# Patient Record
Sex: FEMALE | Race: BLACK OR AFRICAN AMERICAN | NOT HISPANIC OR LATINO | URBAN - METROPOLITAN AREA
[De-identification: names, ages, dates, MRNs, and addresses within clinical notes are randomized per-mention and may not be internally consistent; named-entity substitution may affect disease eponyms.]

---

## 2023-05-22 ENCOUNTER — OFFICE VISIT (OUTPATIENT)
Dept: PRIMARY CARE | Facility: CLINIC | Age: 68
End: 2023-05-22
Payer: COMMERCIAL

## 2023-05-22 ENCOUNTER — LAB (OUTPATIENT)
Dept: LAB | Facility: LAB | Age: 68
End: 2023-05-22
Payer: COMMERCIAL

## 2023-05-22 DIAGNOSIS — R73.9 BORDERLINE HYPERGLYCEMIA: ICD-10-CM

## 2023-05-22 DIAGNOSIS — E78.5 BORDERLINE HYPERLIPIDEMIA: ICD-10-CM

## 2023-05-22 DIAGNOSIS — I48.91 ATRIAL FIBRILLATION, UNSPECIFIED TYPE (MULTI): ICD-10-CM

## 2023-05-22 DIAGNOSIS — Z12.31 BREAST CANCER SCREENING BY MAMMOGRAM: ICD-10-CM

## 2023-05-22 DIAGNOSIS — I48.0 PAROXYSMAL ATRIAL FIBRILLATION WITH RVR (MULTI): ICD-10-CM

## 2023-05-22 DIAGNOSIS — I48.91 ATRIAL FIBRILLATION, UNSPECIFIED TYPE (MULTI): Primary | ICD-10-CM

## 2023-05-22 DIAGNOSIS — Z00.00 ROUTINE GENERAL MEDICAL EXAMINATION AT A HEALTH CARE FACILITY: ICD-10-CM

## 2023-05-22 LAB
ALANINE AMINOTRANSFERASE (SGPT) (U/L) IN SER/PLAS: 15 U/L (ref 7–45)
ALBUMIN (G/DL) IN SER/PLAS: 4.6 G/DL (ref 3.4–5)
ALKALINE PHOSPHATASE (U/L) IN SER/PLAS: 90 U/L (ref 33–136)
ANION GAP IN SER/PLAS: 15 MMOL/L (ref 10–20)
ASPARTATE AMINOTRANSFERASE (SGOT) (U/L) IN SER/PLAS: 17 U/L (ref 9–39)
BASOPHILS (10*3/UL) IN BLOOD BY AUTOMATED COUNT: 0.01 X10E9/L (ref 0–0.1)
BASOPHILS/100 LEUKOCYTES IN BLOOD BY AUTOMATED COUNT: 0.3 % (ref 0–2)
BILIRUBIN TOTAL (MG/DL) IN SER/PLAS: 0.6 MG/DL (ref 0–1.2)
CALCIUM (MG/DL) IN SER/PLAS: 10.6 MG/DL (ref 8.6–10.6)
CARBON DIOXIDE, TOTAL (MMOL/L) IN SER/PLAS: 31 MMOL/L (ref 21–32)
CHLORIDE (MMOL/L) IN SER/PLAS: 106 MMOL/L (ref 98–107)
CHOLESTEROL (MG/DL) IN SER/PLAS: 173 MG/DL (ref 0–199)
CHOLESTEROL IN HDL (MG/DL) IN SER/PLAS: 95.7 MG/DL
CHOLESTEROL/HDL RATIO: 1.8
CREATININE (MG/DL) IN SER/PLAS: 0.82 MG/DL (ref 0.5–1.05)
EOSINOPHILS (10*3/UL) IN BLOOD BY AUTOMATED COUNT: 0.01 X10E9/L (ref 0–0.7)
EOSINOPHILS/100 LEUKOCYTES IN BLOOD BY AUTOMATED COUNT: 0.3 % (ref 0–6)
ERYTHROCYTE DISTRIBUTION WIDTH (RATIO) BY AUTOMATED COUNT: 14.2 % (ref 11.5–14.5)
ERYTHROCYTE MEAN CORPUSCULAR HEMOGLOBIN CONCENTRATION (G/DL) BY AUTOMATED: 31.4 G/DL (ref 32–36)
ERYTHROCYTE MEAN CORPUSCULAR VOLUME (FL) BY AUTOMATED COUNT: 93 FL (ref 80–100)
ERYTHROCYTES (10*6/UL) IN BLOOD BY AUTOMATED COUNT: 5.26 X10E12/L (ref 4–5.2)
ESTIMATED AVERAGE GLUCOSE FOR HBA1C: 111 MG/DL
GFR FEMALE: 79 ML/MIN/1.73M2
GLUCOSE (MG/DL) IN SER/PLAS: 80 MG/DL (ref 74–99)
HEMATOCRIT (%) IN BLOOD BY AUTOMATED COUNT: 48.7 % (ref 36–46)
HEMOGLOBIN (G/DL) IN BLOOD: 15.3 G/DL (ref 12–16)
HEMOGLOBIN A1C/HEMOGLOBIN TOTAL IN BLOOD: 5.5 %
IMMATURE GRANULOCYTES/100 LEUKOCYTES IN BLOOD BY AUTOMATED COUNT: 0.3 % (ref 0–0.9)
LDL: 67 MG/DL (ref 0–99)
LEUKOCYTES (10*3/UL) IN BLOOD BY AUTOMATED COUNT: 4 X10E9/L (ref 4.4–11.3)
LYMPHOCYTES (10*3/UL) IN BLOOD BY AUTOMATED COUNT: 1.58 X10E9/L (ref 1.2–4.8)
LYMPHOCYTES/100 LEUKOCYTES IN BLOOD BY AUTOMATED COUNT: 40 % (ref 13–44)
MONOCYTES (10*3/UL) IN BLOOD BY AUTOMATED COUNT: 0.31 X10E9/L (ref 0.1–1)
MONOCYTES/100 LEUKOCYTES IN BLOOD BY AUTOMATED COUNT: 7.8 % (ref 2–10)
NEUTROPHILS (10*3/UL) IN BLOOD BY AUTOMATED COUNT: 2.03 X10E9/L (ref 1.2–7.7)
NEUTROPHILS/100 LEUKOCYTES IN BLOOD BY AUTOMATED COUNT: 51.3 % (ref 40–80)
NRBC (PER 100 WBCS) BY AUTOMATED COUNT: 0 /100 WBC (ref 0–0)
PLATELETS (10*3/UL) IN BLOOD AUTOMATED COUNT: 176 X10E9/L (ref 150–450)
POTASSIUM (MMOL/L) IN SER/PLAS: 5 MMOL/L (ref 3.5–5.3)
PROTEIN TOTAL: 7.6 G/DL (ref 6.4–8.2)
SODIUM (MMOL/L) IN SER/PLAS: 147 MMOL/L (ref 136–145)
THYROTROPIN (MIU/L) IN SER/PLAS BY DETECTION LIMIT <= 0.05 MIU/L: 0.72 MIU/L (ref 0.44–3.98)
TRIGLYCERIDE (MG/DL) IN SER/PLAS: 50 MG/DL (ref 0–149)
UREA NITROGEN (MG/DL) IN SER/PLAS: 15 MG/DL (ref 6–23)
VLDL: 10 MG/DL (ref 0–40)

## 2023-05-22 PROCEDURE — 85025 COMPLETE CBC W/AUTO DIFF WBC: CPT

## 2023-05-22 PROCEDURE — G0439 PPPS, SUBSEQ VISIT: HCPCS | Performed by: INTERNAL MEDICINE

## 2023-05-22 PROCEDURE — 99214 OFFICE O/P EST MOD 30 MIN: CPT | Performed by: INTERNAL MEDICINE

## 2023-05-22 PROCEDURE — 84443 ASSAY THYROID STIM HORMONE: CPT

## 2023-05-22 PROCEDURE — 80053 COMPREHEN METABOLIC PANEL: CPT

## 2023-05-22 PROCEDURE — 83036 HEMOGLOBIN GLYCOSYLATED A1C: CPT

## 2023-05-22 PROCEDURE — 36415 COLL VENOUS BLD VENIPUNCTURE: CPT

## 2023-05-22 PROCEDURE — 80061 LIPID PANEL: CPT

## 2023-05-22 RX ORDER — METOPROLOL SUCCINATE 50 MG/1
50 TABLET, EXTENDED RELEASE ORAL DAILY
Qty: 30 TABLET | Refills: 11 | Status: SHIPPED | OUTPATIENT
Start: 2023-05-22 | End: 2023-06-13

## 2023-06-01 NOTE — PROGRESS NOTES
No new concerns at this time. No new ED/urgent care visits. No new meds. Previous rash did improve with triamcinolone cream, however she thinks that the cream might have made her tiered/lethargic. She is taking BPs at home taken every day. Last one was 133/79. For exercise does low impact aerobics 4-5 days per week. Good diet consisting of chicken fish vegetables.     A 12-point ROS completed and negative except for that listed in HPI    BP: 144/88; WT: 126    Physical Exam  Constitutional: no acute distress, well appearing and well nourished.   Cardiovascular: irregularly, irregular  Pulmonary: lungs are clear to auscultation.   Abdomen: soft, non tender to palpation, normoactive bowel sounds. No organomegaly,   Extremities: no LE edema   Neurologic: normal examination of cranial nerves, normal examination of motor strength and normal examination of sensation.       Ms. Ramos is a 67 year old female who presents for annual visit. New cardiac irregularity noted on exam consistent with Afib.       #HTN  -has been poorly controlled in the past (possibly white coat) with a myriad med intolerances in the past including most recently lisin, spirono, Maxzide & amlo  -takes BPs at home and states that they are typically around the 130s/70s; in office today is 144/88  -will continue to monitor for now    #New onset Afib  -EKG in office consistent with Afib  -TTE ordered  -coreg and apixaban  -cardiology referral due to med intolerances    #HM   -cscope due 2023 (pending scheduled next month)  -mammogram in July 2021 negative - due July 2023  -normal dexa 03/2023

## 2023-06-07 ENCOUNTER — TELEPHONE (OUTPATIENT)
Dept: PRIMARY CARE | Facility: CLINIC | Age: 68
End: 2023-06-07
Payer: COMMERCIAL

## 2023-06-07 NOTE — TELEPHONE ENCOUNTER
Patient say she is scheduled for a colonoscopy  and need to know what to do about taking her eliquis , also she say she has been taking her bp meds but her hands stay very cold

## 2023-06-13 DIAGNOSIS — I48.91 ATRIAL FIBRILLATION, UNSPECIFIED TYPE (MULTI): ICD-10-CM

## 2023-06-13 RX ORDER — METOPROLOL SUCCINATE 50 MG/1
TABLET, EXTENDED RELEASE ORAL
Qty: 30 TABLET | Refills: 11 | Status: SHIPPED | OUTPATIENT
Start: 2023-06-13 | End: 2023-09-19 | Stop reason: ALTCHOICE

## 2023-08-23 ENCOUNTER — HOSPITAL ENCOUNTER (OUTPATIENT)
Dept: DATA CONVERSION | Facility: HOSPITAL | Age: 68
End: 2023-08-23
Attending: INTERNAL MEDICINE | Admitting: INTERNAL MEDICINE
Payer: COMMERCIAL

## 2023-08-23 DIAGNOSIS — I48.91 UNSPECIFIED ATRIAL FIBRILLATION (MULTI): ICD-10-CM

## 2023-08-24 LAB
ALANINE AMINOTRANSFERASE (SGPT) (U/L) IN SER/PLAS: NORMAL
ALBUMIN (G/DL) IN SER/PLAS: NORMAL
ALKALINE PHOSPHATASE (U/L) IN SER/PLAS: NORMAL
ANION GAP IN SER/PLAS: NORMAL
ASPARTATE AMINOTRANSFERASE (SGOT) (U/L) IN SER/PLAS: NORMAL
BILIRUBIN TOTAL (MG/DL) IN SER/PLAS: NORMAL
CALCIUM (MG/DL) IN SER/PLAS: NORMAL
CARBON DIOXIDE, TOTAL (MMOL/L) IN SER/PLAS: NORMAL
CHLORIDE (MMOL/L) IN SER/PLAS: NORMAL
CREATININE (MG/DL) IN SER/PLAS: NORMAL
GFR FEMALE: NORMAL
GFR MALE: NORMAL
GLUCOSE (MG/DL) IN SER/PLAS: NORMAL
POTASSIUM (MMOL/L) IN SER/PLAS: NORMAL
PROTEIN TOTAL: NORMAL
SODIUM (MMOL/L) IN SER/PLAS: NORMAL
UREA NITROGEN (MG/DL) IN SER/PLAS: NORMAL

## 2023-08-29 ENCOUNTER — OFFICE VISIT (OUTPATIENT)
Dept: PRIMARY CARE | Facility: CLINIC | Age: 68
End: 2023-08-29
Payer: COMMERCIAL

## 2023-08-29 DIAGNOSIS — I48.91 ATRIAL FIBRILLATION, UNSPECIFIED TYPE (MULTI): ICD-10-CM

## 2023-08-29 DIAGNOSIS — I10 BENIGN ESSENTIAL HYPERTENSION: Primary | ICD-10-CM

## 2023-08-29 LAB
ATRIAL RATE: 67 BPM
ATRIAL RATE: 97 BPM
P AXIS: 75 DEGREES
P OFFSET: 205 MS
P ONSET: 146 MS
PR INTERVAL: 162 MS
Q ONSET: 227 MS
Q ONSET: 228 MS
QRS COUNT: 11 BEATS
QRS COUNT: 20 BEATS
QRS DURATION: 64 MS
QRS DURATION: 64 MS
QT INTERVAL: 294 MS
QT INTERVAL: 342 MS
QTC CALCULATION(BAZETT): 361 MS
QTC CALCULATION(BAZETT): 422 MS
QTC FREDERICIA: 355 MS
QTC FREDERICIA: 374 MS
R AXIS: 106 DEGREES
R AXIS: 99 DEGREES
T AXIS: 47 DEGREES
T AXIS: 61 DEGREES
T OFFSET: 375 MS
T OFFSET: 398 MS
VENTRICULAR RATE: 124 BPM
VENTRICULAR RATE: 67 BPM

## 2023-08-29 PROCEDURE — 99214 OFFICE O/P EST MOD 30 MIN: CPT | Performed by: INTERNAL MEDICINE

## 2023-08-29 PROCEDURE — 1125F AMNT PAIN NOTED PAIN PRSNT: CPT | Performed by: INTERNAL MEDICINE

## 2023-08-29 RX ORDER — BISOPROLOL FUMARATE 10 MG/1
TABLET, FILM COATED ORAL
Qty: 30 TABLET | Refills: 1 | Status: SHIPPED | OUTPATIENT
Start: 2023-08-29 | End: 2023-09-11

## 2023-08-29 NOTE — PROGRESS NOTES
Sariah Ramos is a 68 yo F presenting for acute visit.     1. New onset AF 5.23 in office with me s/p normal TTE 6.23, started on Eliquis and Topro 50  and referred to cardiology s/p cardioversion 8-23-23.       2. HTN   -multiple med intolerances incl lisin, spiron, amlo, maxzide     3. Tinnitus     #HM   -screen labs 5.23  -mammo 7.23  -pap 7.19 with gyne   -cscope due and ordered and pending         ROS   CV neg CP neg exertional dyspnea   Neuro neg ha neg dizziness     O   160/97   65   Gen Aox3 NAD well appearing   HEENT mmm pharynx clear no cervical LAD   Eyes sclerae clear   CV rrr nl s1/2 no m/r/g  Pulm CTAB no adventitious sounds   Ext warm dry no edema 2+ DP pulse       EKG sinus velvet       A/P   Sariah presents with uncontrolled HTN in setitng of myriad med intolerances including thiazide, spirono, lisin and Maxzide now on Toprol 50 with incomplete control, on DOAC s/p DCCV last week.   -continue Eliquis at least 30 more days - has FU with cardiology   -stop metoprolol   -start bisopprolol 10   -RTO 1 month for a BP check

## 2023-09-11 ENCOUNTER — TELEPHONE (OUTPATIENT)
Dept: PRIMARY CARE | Facility: CLINIC | Age: 68
End: 2023-09-11
Payer: COMMERCIAL

## 2023-09-11 DIAGNOSIS — I10 BENIGN ESSENTIAL HYPERTENSION: Primary | ICD-10-CM

## 2023-09-11 RX ORDER — BISOPROLOL FUMARATE 5 MG/1
TABLET, FILM COATED ORAL
Qty: 30 TABLET | Refills: 0 | Status: SHIPPED | OUTPATIENT
Start: 2023-09-11 | End: 2023-10-04

## 2023-09-11 NOTE — TELEPHONE ENCOUNTER
Patient say on Saturday  she started feeling  light headed, clammy, and nauseous  , she checked her bp and  it was 97/68 and pulse was 45, and then 102/67 pulse 44

## 2023-09-19 ENCOUNTER — OFFICE VISIT (OUTPATIENT)
Dept: PRIMARY CARE | Facility: CLINIC | Age: 68
End: 2023-09-19
Payer: COMMERCIAL

## 2023-09-19 VITALS — DIASTOLIC BLOOD PRESSURE: 81 MMHG | SYSTOLIC BLOOD PRESSURE: 134 MMHG | HEART RATE: 65 BPM

## 2023-09-19 DIAGNOSIS — Z12.11 COLON CANCER SCREENING: Primary | ICD-10-CM

## 2023-09-19 PROCEDURE — 1126F AMNT PAIN NOTED NONE PRSNT: CPT | Performed by: INTERNAL MEDICINE

## 2023-09-19 PROCEDURE — 99213 OFFICE O/P EST LOW 20 MIN: CPT | Performed by: INTERNAL MEDICINE

## 2023-09-19 NOTE — PATIENT INSTRUCTIONS
Sariah,     Call [phone] to schedule colonoscopy with Dr. Matute.   See me back in 6 months for a physical!     CL

## 2023-09-19 NOTE — PROGRESS NOTES
"Sariah Ramos is a 66 yo F presenting for CPE.     AF 5.23 s/p normal TTE 6.23, started on Eliquis, Toprol, s/p DCCV 8-23-23 - saw cardiology 9.23 rec'ed OK to stop Eliquis 9.30.23  HTN - multiple med intolerances incl lisin, amlo, spirono, Maxzide   Tinnitus     #HM   -screen labs 5.23   -mammo 7.23   -cscope due   -pap 7.19 with gyne   -due flu, tdap, shingrix, [id number]        67\"   120         Gen Aox3 NAD well appearing   HEENT mmm pharynx clear no cervical LAD   Eyes sclerae clear   CV rrr nl s1/2 no m/r/g  Pulm CTAB no adventitious sounds   Ext warm dry no edema 2+ DP pulse         VSS - /81 JR 65   Gen alert well appeairng NAD   CV rr nl s1/2 no m/r/g   Pulm CTAB       A/P   BP is good control on bisoprolol 5; saw cardiology yesterday with EKG in NSR after DCCV with plan to discontinue DOAC at end of the month per cardiology.   OK to see me back in 6 mo sooner PRN with guidelines on if HR gets fast or irregular to call at once   "

## 2023-09-27 ENCOUNTER — APPOINTMENT (OUTPATIENT)
Dept: PRIMARY CARE | Facility: CLINIC | Age: 68
End: 2023-09-27
Payer: COMMERCIAL

## 2023-09-29 VITALS — WEIGHT: 125.22 LBS | BODY MASS INDEX: 19.65 KG/M2 | HEIGHT: 67 IN

## 2023-10-04 DIAGNOSIS — I10 BENIGN ESSENTIAL HYPERTENSION: ICD-10-CM

## 2023-10-04 RX ORDER — BISOPROLOL FUMARATE 5 MG/1
TABLET, FILM COATED ORAL
Qty: 30 TABLET | Refills: 0 | Status: SHIPPED | OUTPATIENT
Start: 2023-10-04 | End: 2023-10-30

## 2023-10-28 DIAGNOSIS — I10 BENIGN ESSENTIAL HYPERTENSION: ICD-10-CM

## 2023-10-30 RX ORDER — BISOPROLOL FUMARATE 5 MG/1
TABLET, FILM COATED ORAL
Qty: 90 TABLET | Refills: 1 | Status: SHIPPED | OUTPATIENT
Start: 2023-10-30 | End: 2024-04-29

## 2023-10-31 ENCOUNTER — OFFICE VISIT (OUTPATIENT)
Dept: PRIMARY CARE | Facility: CLINIC | Age: 68
End: 2023-10-31
Payer: COMMERCIAL

## 2023-10-31 VITALS — HEART RATE: 70 BPM | SYSTOLIC BLOOD PRESSURE: 145 MMHG | DIASTOLIC BLOOD PRESSURE: 90 MMHG

## 2023-10-31 DIAGNOSIS — R19.09 GROIN MASS: Primary | ICD-10-CM

## 2023-10-31 PROCEDURE — 1126F AMNT PAIN NOTED NONE PRSNT: CPT | Performed by: INTERNAL MEDICINE

## 2023-10-31 PROCEDURE — 99213 OFFICE O/P EST LOW 20 MIN: CPT | Performed by: INTERNAL MEDICINE

## 2023-10-31 ASSESSMENT — ENCOUNTER SYMPTOMS
EYES NEGATIVE: 1
ENDOCRINE NEGATIVE: 1
CARDIOVASCULAR NEGATIVE: 1
RESPIRATORY NEGATIVE: 1
GASTROINTESTINAL NEGATIVE: 1
PSYCHIATRIC NEGATIVE: 1
NEUROLOGICAL NEGATIVE: 1
CONSTITUTIONAL NEGATIVE: 1
MUSCULOSKELETAL NEGATIVE: 1
DIFFICULTY URINATING: 0

## 2023-10-31 NOTE — PATIENT INSTRUCTIONS
Sariah,     Go to radiology or call [phone] to schedule an ultrasound of that area on the right groin.     With those results, I will let you know the next steps.       CL

## 2023-10-31 NOTE — PROGRESS NOTES
Subjective   Patient ID: Sariah Ramos is a 68 y.o. female with pmhx of afib and HTN who presents with a chief complaint of a bump in her groin area.    Reports a knot in her groin.  States it's non-tender.  She noticed it 6 weeks ago.  States that sometimes it goes away and then occasionally she notices it.  Reports it to be internal.  Denies fevers, chills, or night-sweats.  Denies increased urinary frequency or burning urination.  She noticed it once in the past post shaving but states it resolved on it's own.  States she has been doing warm compresses and that seems to help bring it down.        Review of Systems   Constitutional: Negative.    HENT: Negative.     Eyes: Negative.    Respiratory: Negative.     Cardiovascular: Negative.    Gastrointestinal: Negative.    Endocrine: Negative.    Genitourinary: Negative.  Negative for difficulty urinating.   Musculoskeletal: Negative.    Skin: Negative.    Neurological: Negative.    Psychiatric/Behavioral: Negative.         Objective   Physical Exam  Constitutional:       General: She is not in acute distress.     Appearance: Normal appearance. She is normal weight.   HENT:      Head: Normocephalic and atraumatic.      Mouth/Throat:      Mouth: Mucous membranes are moist.   Eyes:      Extraocular Movements: Extraocular movements intact.   Cardiovascular:      Rate and Rhythm: Normal rate and regular rhythm.      Heart sounds: No murmur heard.     No gallop.   Pulmonary:      Effort: Pulmonary effort is normal. No respiratory distress.      Breath sounds: No wheezing.   Abdominal:      General: Abdomen is flat. There is no distension.      Palpations: Abdomen is soft.      Tenderness: There is no abdominal tenderness.   Genitourinary:     Comments: Mobile, nontender, inguinal lump, able to be manipulated, no erythema or warmth, no skin changes  Musculoskeletal:         General: Normal range of motion.      Cervical back: Normal range of motion.   Neurological:       General: No focal deficit present.      Mental Status: She is alert.   Psychiatric:         Mood and Affect: Mood normal.     There were no vitals taken for this visit.  Lab Results   Component Value Date    WBC 4.0 (L) 05/22/2023    HGB 15.3 05/22/2023    HCT 48.7 (H) 05/22/2023    MCV 93 05/22/2023     05/22/2023     Lab Results   Component Value Date    GLUCOSE CANCELED 08/24/2023    CALCIUM CANCELED 08/24/2023    NA CANCELED 08/24/2023    K CANCELED 08/24/2023    CO2 CANCELED 08/24/2023    CL CANCELED 08/24/2023    BUN CANCELED 08/24/2023    CREATININE CANCELED 08/24/2023       Assessment/Plan   Sariah Ramos is a 68 y.o. female with pmhx of afib and HTN who presents with a chief complaint of a bump in her groin area c/f possible hernia vs. Enlarged LN.  Notes it's non-tender and goes and comes.    #Inguinal Hernia vs. Enlarged LN  -will refer to gen-surg hernia specialist for eval   -pelvic ultrasound to rule out LN  -asymptomatic  -will ctm    #HTN  -cont. Current regimen    Maggie Madison  Internal Medicine, PGY-3

## 2023-11-08 ENCOUNTER — HOSPITAL ENCOUNTER (OUTPATIENT)
Dept: RADIOLOGY | Facility: HOSPITAL | Age: 68
Discharge: HOME | End: 2023-11-08
Payer: COMMERCIAL

## 2023-11-08 DIAGNOSIS — R19.09 GROIN MASS: ICD-10-CM

## 2023-11-08 PROCEDURE — 76881 US COMPL JOINT R-T W/IMG: CPT | Mod: RT

## 2023-11-08 PROCEDURE — 76882 US LMTD JT/FCL EVL NVASC XTR: CPT | Mod: RIGHT SIDE | Performed by: RADIOLOGY

## 2023-12-04 DIAGNOSIS — R19.09 GROIN MASS: Primary | ICD-10-CM

## 2023-12-18 PROBLEM — I10 WHITE COAT SYNDROME WITH DIAGNOSIS OF HYPERTENSION: Status: ACTIVE | Noted: 2023-12-18

## 2023-12-18 PROBLEM — I48.91 ATRIAL FIBRILLATION (MULTI): Status: ACTIVE | Noted: 2023-12-18

## 2023-12-18 PROBLEM — K21.9 GERD WITHOUT ESOPHAGITIS: Status: ACTIVE | Noted: 2023-12-18

## 2023-12-18 PROBLEM — L30.9 ECZEMA: Status: ACTIVE | Noted: 2023-12-18

## 2023-12-18 PROBLEM — M54.42 ACUTE LOW BACK PAIN WITH LEFT-SIDED SCIATICA: Status: ACTIVE | Noted: 2023-12-18

## 2023-12-18 PROBLEM — I10 BENIGN ESSENTIAL HYPERTENSION: Status: ACTIVE | Noted: 2023-12-18

## 2023-12-18 RX ORDER — TRIAMCINOLONE ACETONIDE 5 MG/G
CREAM TOPICAL
COMMUNITY
Start: 2023-03-16

## 2023-12-19 ENCOUNTER — OFFICE VISIT (OUTPATIENT)
Dept: CARDIOLOGY | Facility: CLINIC | Age: 68
End: 2023-12-19
Payer: COMMERCIAL

## 2023-12-19 VITALS
SYSTOLIC BLOOD PRESSURE: 130 MMHG | DIASTOLIC BLOOD PRESSURE: 76 MMHG | HEIGHT: 67 IN | HEART RATE: 63 BPM | BODY MASS INDEX: 17.6 KG/M2 | WEIGHT: 112.1 LBS | OXYGEN SATURATION: 98 %

## 2023-12-19 DIAGNOSIS — I10 BENIGN ESSENTIAL HYPERTENSION: Primary | ICD-10-CM

## 2023-12-19 PROCEDURE — 3078F DIAST BP <80 MM HG: CPT | Performed by: INTERNAL MEDICINE

## 2023-12-19 PROCEDURE — 99214 OFFICE O/P EST MOD 30 MIN: CPT | Performed by: INTERNAL MEDICINE

## 2023-12-19 PROCEDURE — 1126F AMNT PAIN NOTED NONE PRSNT: CPT | Performed by: INTERNAL MEDICINE

## 2023-12-19 PROCEDURE — 1036F TOBACCO NON-USER: CPT | Performed by: INTERNAL MEDICINE

## 2023-12-19 PROCEDURE — 1159F MED LIST DOCD IN RCRD: CPT | Performed by: INTERNAL MEDICINE

## 2023-12-19 PROCEDURE — 3075F SYST BP GE 130 - 139MM HG: CPT | Performed by: INTERNAL MEDICINE

## 2023-12-19 ASSESSMENT — ENCOUNTER SYMPTOMS
BLOATING: 0
ABDOMINAL PAIN: 0
OCCASIONAL FEELINGS OF UNSTEADINESS: 0
NAUSEA: 0
ALTERED MENTAL STATUS: 0
CONSTIPATION: 0
CHILLS: 0
MEMORY LOSS: 0
DEPRESSION: 0
WHEEZING: 0
FALLS: 0
COUGH: 0
MYALGIAS: 0
FEVER: 0
DIARRHEA: 0
HEADACHES: 0
LOSS OF SENSATION IN FEET: 0
DYSURIA: 0
HEMOPTYSIS: 0
VOMITING: 0
HEMATURIA: 0

## 2023-12-19 ASSESSMENT — COLUMBIA-SUICIDE SEVERITY RATING SCALE - C-SSRS
2. HAVE YOU ACTUALLY HAD ANY THOUGHTS OF KILLING YOURSELF?: NO
1. IN THE PAST MONTH, HAVE YOU WISHED YOU WERE DEAD OR WISHED YOU COULD GO TO SLEEP AND NOT WAKE UP?: NO
6. HAVE YOU EVER DONE ANYTHING, STARTED TO DO ANYTHING, OR PREPARED TO DO ANYTHING TO END YOUR LIFE?: NO

## 2023-12-19 ASSESSMENT — PATIENT HEALTH QUESTIONNAIRE - PHQ9
2. FEELING DOWN, DEPRESSED OR HOPELESS: MORE THAN HALF THE DAYS
SUM OF ALL RESPONSES TO PHQ9 QUESTIONS 1 AND 2: 2
1. LITTLE INTEREST OR PLEASURE IN DOING THINGS: NOT AT ALL
10. IF YOU CHECKED OFF ANY PROBLEMS, HOW DIFFICULT HAVE THESE PROBLEMS MADE IT FOR YOU TO DO YOUR WORK, TAKE CARE OF THINGS AT HOME, OR GET ALONG WITH OTHER PEOPLE: NOT DIFFICULT AT ALL

## 2023-12-19 ASSESSMENT — PAIN SCALES - GENERAL: PAINLEVEL: 0-NO PAIN

## 2023-12-19 NOTE — PATIENT INSTRUCTIONS
If having palpitations or heart rate is unusually high at home, come to office for non-emergent walk-in EKG (and resume Eliquis until can get the EKG)

## 2023-12-19 NOTE — PROGRESS NOTES
Chief complaint:  Follow-up     HPI  69 yo BF w/ h/o AFIB (dx'ed 5/23) s/p DCCV 8/23, HTN now here for cardiology f/u. She continues to feel good (no change after DCCV). No chest pain. No dyspnea. No palps. No LH/dizziness/syncope. +occ min LE edema. No claudication. No cough. +occ fatigue after taking BB. +occ snoring.  Originally when dx'ed with AFIB, she was having KIRK and palps. Since adding BB, no sig KIRK/palps.   BP at home: 120s/70s, HR 50s (was 90s in AFIB) [pt claims cuff correlated before]  ECG 12/18: SR (72)  ECG 5/22: SB (58)  ECG 5/23: AFIB (106), ?SMI  ECG 8/23: AFIB (124), low volt, ?ASMI  ECG 8/23: SR (67), PAC, low volt, ?SMI  ECG 9/23: SB (55), PACs, ?SMI  Echo 6/23: EF 65-70%, mild LAE, mild-mod TR [AFIB]     Review of Systems   Constitutional: Negative for chills, fever and malaise/fatigue.   HENT:  Negative for hearing loss.    Eyes:  Negative for visual disturbance.   Respiratory:  Negative for cough, hemoptysis and wheezing.    Skin:  Negative for rash.   Musculoskeletal:  Negative for falls and myalgias.   Gastrointestinal:  Negative for bloating, abdominal pain, constipation, diarrhea, dysphagia, nausea and vomiting.   Genitourinary:  Negative for dysuria and hematuria.   Neurological:  Negative for headaches.   Psychiatric/Behavioral:  Negative for altered mental status, depression and memory loss.         Social History     Tobacco Use    Smoking status: Never    Smokeless tobacco: Never   Substance Use Topics    Alcohol use: Never        No pertinent FHx    Allergies   Allergen Reactions    Iodinated Contrast Media Unknown    Lisinopril Unknown    Meperidine Unknown        Current Outpatient Medications   Medication Instructions    apixaban (ELIQUIS) 5 mg, oral, 2 times daily    bisoprolol (Zebeta) 5 mg tablet TAKE 1 TABELT BY MOUTH AT BEDTIME FOR BLOOD PRESSURE    triamcinolone (Kenalog) 0.5 % cream Topical        Vitals:    12/19/23 1145   BP: 130/76   Pulse: 63   SpO2: 98%         Physical Exam  Constitutional:       Appearance: Normal appearance.   HENT:      Head: Normocephalic and atraumatic.      Nose: Nose normal.   Neck:      Vascular: No carotid bruit.   Cardiovascular:      Rate and Rhythm: Normal rate and regular rhythm.      Heart sounds: No murmur heard.  Pulmonary:      Effort: Pulmonary effort is normal.      Breath sounds: Normal breath sounds.   Abdominal:      Palpations: Abdomen is soft.      Tenderness: There is no abdominal tenderness.   Musculoskeletal:      Right lower leg: No edema.      Left lower leg: No edema.   Skin:     General: Skin is warm and dry.   Neurological:      General: No focal deficit present.      Mental Status: She is alert.   Psychiatric:         Mood and Affect: Mood normal.         Judgment: Judgment normal.        Results/Data  5/23 Cr 0.82, K 5.0, LFT nl, LDL 67, HDL 96, TG 50, Chol 173, HGB 15.3, , hgba1c 5.5, TSH 0.72     Assessment/Plan   67 yo BF w/ h/o AFIB (dx'ed 5/23) s/p DCCV 8/23, HTN. Doing well. Remains in SR. Get walk-in EKG (and resume Eliquis) if palps or higher HR at home. Consider sleep study, simba if AFIB recurs and/or more snoring/fatigue.  Check CT cardiac score to determine need for ASA (off Eliquis)  -continue Bisoprolol 5 qd  -f/u 6 months (earlier if needed)    Steve Price MD

## 2023-12-21 ENCOUNTER — OFFICE VISIT (OUTPATIENT)
Dept: SURGERY | Facility: CLINIC | Age: 68
End: 2023-12-21
Payer: COMMERCIAL

## 2023-12-21 VITALS
DIASTOLIC BLOOD PRESSURE: 102 MMHG | SYSTOLIC BLOOD PRESSURE: 183 MMHG | BODY MASS INDEX: 17.81 KG/M2 | WEIGHT: 113.7 LBS | HEART RATE: 66 BPM | TEMPERATURE: 97.3 F

## 2023-12-21 DIAGNOSIS — R19.09 GROIN MASS: Primary | ICD-10-CM

## 2023-12-21 DIAGNOSIS — M67.451 GANGLION CYST OF RIGHT GROIN: ICD-10-CM

## 2023-12-21 PROCEDURE — 1159F MED LIST DOCD IN RCRD: CPT | Performed by: SURGERY

## 2023-12-21 PROCEDURE — 99203 OFFICE O/P NEW LOW 30 MIN: CPT | Performed by: SURGERY

## 2023-12-21 PROCEDURE — 1126F AMNT PAIN NOTED NONE PRSNT: CPT | Performed by: SURGERY

## 2023-12-21 PROCEDURE — 1036F TOBACCO NON-USER: CPT | Performed by: SURGERY

## 2023-12-21 PROCEDURE — 1160F RVW MEDS BY RX/DR IN RCRD: CPT | Performed by: SURGERY

## 2023-12-21 PROCEDURE — 3080F DIAST BP >= 90 MM HG: CPT | Performed by: SURGERY

## 2023-12-21 PROCEDURE — 3077F SYST BP >= 140 MM HG: CPT | Performed by: SURGERY

## 2023-12-21 NOTE — PROGRESS NOTES
History Of Present Illness  Sariah Ramos is a 68 y.o. female presenting a right groin cyst.  She has noticed it over the summer.  She initially thought was ingrown hair.  She had an ultrasound of this area that shows a simple cyst.  No evidence of any inguinal hernia.  She states that this mass does not come and go it tends to always be there.  Is not causing her any pain.  She has had no previous abdominal surgeries.  She had a recent GYN evaluation September.        Last Recorded Vitals  Blood pressure (!) 183/102, pulse 66, temperature 36.3 °C (97.3 °F), weight 51.6 kg (113 lb 11.2 oz).  Physical Examination    On examination she has a small 1-1/2 to 2 cm cyst in the right groin.  I attempted to aspirate it but was unable to do it successfully.  An upper standing up exam and lying down does not feel like any type of hernia is nonreducible.  The inguinal canal separate from it.  No evidence of abdominal ascites.    Relevant Results  Reviewed the ultrasound.    Assessment/Plan simple cyst of the right groin.  I do not think this is a hernia could be a communicating type of hydrocele we see in men but it does not seem to come and go for.  Inguinal canals otherwise intact.  She will follow-up me for repeat exam in 3 months.  Presently we will just watch this conservatively.  I discussed surgical excision of this looking to see if there is a small communicating hernia but presently it is not causing her discomfort and ultrasound did not show evidence of a true hernia.  She agrees with this conservative management.  She starts having problems she will contact me.    Nils Gonzáles MD FACS  Professor of Surgery  Tere Alfredo Chair in Surgical [place]  [facility] [employer]  [id number] [place] [address], [id number]  Phone [phone]  email: [email or web address]

## 2023-12-22 ENCOUNTER — APPOINTMENT (OUTPATIENT)
Dept: CARDIOLOGY | Facility: CLINIC | Age: 68
End: 2023-12-22
Payer: COMMERCIAL

## 2024-03-21 ENCOUNTER — OFFICE VISIT (OUTPATIENT)
Dept: SURGERY | Facility: CLINIC | Age: 69
End: 2024-03-21
Payer: COMMERCIAL

## 2024-03-21 VITALS
SYSTOLIC BLOOD PRESSURE: 183 MMHG | TEMPERATURE: 96.4 F | HEART RATE: 67 BPM | WEIGHT: 118.3 LBS | HEIGHT: 66 IN | BODY MASS INDEX: 19.01 KG/M2 | DIASTOLIC BLOOD PRESSURE: 91 MMHG

## 2024-03-21 DIAGNOSIS — R19.09 GROIN MASS: Primary | ICD-10-CM

## 2024-03-21 PROCEDURE — 1159F MED LIST DOCD IN RCRD: CPT | Performed by: SURGERY

## 2024-03-21 PROCEDURE — 1036F TOBACCO NON-USER: CPT | Performed by: SURGERY

## 2024-03-21 PROCEDURE — 99213 OFFICE O/P EST LOW 20 MIN: CPT | Performed by: SURGERY

## 2024-03-21 PROCEDURE — 3077F SYST BP >= 140 MM HG: CPT | Performed by: SURGERY

## 2024-03-21 PROCEDURE — 1126F AMNT PAIN NOTED NONE PRSNT: CPT | Performed by: SURGERY

## 2024-03-21 PROCEDURE — 3080F DIAST BP >= 90 MM HG: CPT | Performed by: SURGERY

## 2024-03-21 ASSESSMENT — PAIN SCALES - GENERAL: PAINLEVEL: 0-NO PAIN

## 2024-03-21 NOTE — PROGRESS NOTES
"History Of Present Illness  Sariah Ramos is a 68 y.o. female presenting for repeat evaluation of her right groin cyst.  She had an ultrasound that showed it was a simple cyst in October.  On my examination I did not feel like a hernia in December.  She is having no pain from it whatsoever.  Does not bother her.  She had a previous midline incision      Last Recorded Vitals  Blood pressure (!) 183/91, pulse 67, temperature 35.8 °C (96.4 °F), height 1.676 m (5' 6\"), weight 53.7 kg (118 lb 4.8 oz).  Physical Exam on examination the cyst has not changed in size.  Nontender.  She is midline incision.      Assessment/Plan   Cyst in right groin that is asymptomatic.  It is not changed in size.  I not think it is a hernia.  Ultrasound did not show is a hernia.  If she wants it removed she will follow-up with me otherwise we will just can continue to observe it.  She will follow-up me as needed.    Nils Gonzáles MD FACS  Professor of Surgery  Tere Alfredo Chair in Surgical [place]  [facility]  [id number] [place] [address], [phone]  Phone [phone]  email: [email or web address]        "

## 2024-04-02 ENCOUNTER — HOSPITAL ENCOUNTER (OUTPATIENT)
Dept: RADIOLOGY | Facility: CLINIC | Age: 69
Discharge: HOME | End: 2024-04-02
Payer: COMMERCIAL

## 2024-04-02 DIAGNOSIS — I10 BENIGN ESSENTIAL HYPERTENSION: ICD-10-CM

## 2024-04-02 PROCEDURE — 75571 CT HRT W/O DYE W/CA TEST: CPT

## 2024-04-20 ENCOUNTER — APPOINTMENT (OUTPATIENT)
Dept: RADIOLOGY | Facility: CLINIC | Age: 69
End: 2024-04-20
Payer: COMMERCIAL

## 2024-04-27 DIAGNOSIS — I10 BENIGN ESSENTIAL HYPERTENSION: ICD-10-CM

## 2024-04-29 RX ORDER — BISOPROLOL FUMARATE 5 MG/1
5 TABLET, FILM COATED ORAL NIGHTLY
Qty: 90 TABLET | Refills: 1 | Status: SHIPPED | OUTPATIENT
Start: 2024-04-29

## 2024-08-01 ENCOUNTER — APPOINTMENT (OUTPATIENT)
Dept: CARDIOLOGY | Facility: HOSPITAL | Age: 69
End: 2024-08-01
Payer: COMMERCIAL

## 2024-08-01 ENCOUNTER — HOSPITAL ENCOUNTER (EMERGENCY)
Facility: HOSPITAL | Age: 69
Discharge: HOME | End: 2024-08-01
Attending: INTERNAL MEDICINE
Payer: COMMERCIAL

## 2024-08-01 ENCOUNTER — APPOINTMENT (OUTPATIENT)
Dept: RADIOLOGY | Facility: HOSPITAL | Age: 69
End: 2024-08-01
Payer: COMMERCIAL

## 2024-08-01 VITALS
OXYGEN SATURATION: 99 % | SYSTOLIC BLOOD PRESSURE: 188 MMHG | TEMPERATURE: 97.7 F | WEIGHT: 123 LBS | BODY MASS INDEX: 19.3 KG/M2 | DIASTOLIC BLOOD PRESSURE: 105 MMHG | HEIGHT: 67 IN | HEART RATE: 58 BPM | RESPIRATION RATE: 18 BRPM

## 2024-08-01 DIAGNOSIS — I15.9 SECONDARY HYPERTENSION: Primary | ICD-10-CM

## 2024-08-01 LAB
ANION GAP SERPL CALC-SCNC: 14 MMOL/L (ref 10–20)
ATRIAL RATE: 80 BPM
BASOPHILS # BLD AUTO: 0.01 X10*3/UL (ref 0–0.1)
BASOPHILS NFR BLD AUTO: 0.2 %
BUN SERPL-MCNC: 19 MG/DL (ref 6–23)
CALCIUM SERPL-MCNC: 10.1 MG/DL (ref 8.6–10.3)
CARDIAC TROPONIN I PNL SERPL HS: <3 NG/L (ref 0–13)
CHLORIDE SERPL-SCNC: 102 MMOL/L (ref 98–107)
CO2 SERPL-SCNC: 28 MMOL/L (ref 21–32)
CREAT SERPL-MCNC: 0.77 MG/DL (ref 0.5–1.05)
EGFRCR SERPLBLD CKD-EPI 2021: 85 ML/MIN/1.73M*2
EOSINOPHIL # BLD AUTO: 0.01 X10*3/UL (ref 0–0.7)
EOSINOPHIL NFR BLD AUTO: 0.2 %
ERYTHROCYTE [DISTWIDTH] IN BLOOD BY AUTOMATED COUNT: 13.4 % (ref 11.5–14.5)
GLUCOSE SERPL-MCNC: 91 MG/DL (ref 74–99)
HCT VFR BLD AUTO: 48.3 % (ref 36–46)
HGB BLD-MCNC: 15.5 G/DL (ref 12–16)
IMM GRANULOCYTES # BLD AUTO: 0 X10*3/UL (ref 0–0.7)
IMM GRANULOCYTES NFR BLD AUTO: 0 % (ref 0–0.9)
LYMPHOCYTES # BLD AUTO: 1.4 X10*3/UL (ref 1.2–4.8)
LYMPHOCYTES NFR BLD AUTO: 33.5 %
MCH RBC QN AUTO: 29.6 PG (ref 26–34)
MCHC RBC AUTO-ENTMCNC: 32.1 G/DL (ref 32–36)
MCV RBC AUTO: 92 FL (ref 80–100)
MONOCYTES # BLD AUTO: 0.28 X10*3/UL (ref 0.1–1)
MONOCYTES NFR BLD AUTO: 6.7 %
NEUTROPHILS # BLD AUTO: 2.48 X10*3/UL (ref 1.2–7.7)
NEUTROPHILS NFR BLD AUTO: 59.4 %
NRBC BLD-RTO: 0 /100 WBCS (ref 0–0)
P AXIS: 82 DEGREES
P OFFSET: 199 MS
P ONSET: 152 MS
PLATELET # BLD AUTO: 157 X10*3/UL (ref 150–450)
POTASSIUM SERPL-SCNC: 3.7 MMOL/L (ref 3.5–5.3)
PR INTERVAL: 154 MS
Q ONSET: 229 MS
QRS COUNT: 13 BEATS
QRS DURATION: 60 MS
QT INTERVAL: 360 MS
QTC CALCULATION(BAZETT): 415 MS
QTC FREDERICIA: 396 MS
R AXIS: 101 DEGREES
RBC # BLD AUTO: 5.23 X10*6/UL (ref 4–5.2)
SODIUM SERPL-SCNC: 140 MMOL/L (ref 136–145)
T AXIS: 60 DEGREES
T OFFSET: 409 MS
VENTRICULAR RATE: 80 BPM
WBC # BLD AUTO: 4.2 X10*3/UL (ref 4.4–11.3)

## 2024-08-01 PROCEDURE — 71046 X-RAY EXAM CHEST 2 VIEWS: CPT | Performed by: RADIOLOGY

## 2024-08-01 PROCEDURE — 80048 BASIC METABOLIC PNL TOTAL CA: CPT | Performed by: INTERNAL MEDICINE

## 2024-08-01 PROCEDURE — 36415 COLL VENOUS BLD VENIPUNCTURE: CPT | Performed by: EMERGENCY MEDICINE

## 2024-08-01 PROCEDURE — 84484 ASSAY OF TROPONIN QUANT: CPT | Performed by: EMERGENCY MEDICINE

## 2024-08-01 PROCEDURE — 93005 ELECTROCARDIOGRAM TRACING: CPT

## 2024-08-01 PROCEDURE — 84484 ASSAY OF TROPONIN QUANT: CPT | Performed by: INTERNAL MEDICINE

## 2024-08-01 PROCEDURE — 2500000001 HC RX 250 WO HCPCS SELF ADMINISTERED DRUGS (ALT 637 FOR MEDICARE OP): Performed by: NURSE PRACTITIONER

## 2024-08-01 PROCEDURE — 80048 BASIC METABOLIC PNL TOTAL CA: CPT | Performed by: EMERGENCY MEDICINE

## 2024-08-01 PROCEDURE — 71046 X-RAY EXAM CHEST 2 VIEWS: CPT

## 2024-08-01 PROCEDURE — 85025 COMPLETE CBC W/AUTO DIFF WBC: CPT | Performed by: INTERNAL MEDICINE

## 2024-08-01 PROCEDURE — 99283 EMERGENCY DEPT VISIT LOW MDM: CPT

## 2024-08-01 PROCEDURE — 85025 COMPLETE CBC W/AUTO DIFF WBC: CPT | Performed by: EMERGENCY MEDICINE

## 2024-08-01 RX ORDER — CLONIDINE HYDROCHLORIDE 0.1 MG/1
0.2 TABLET ORAL ONCE
Status: COMPLETED | OUTPATIENT
Start: 2024-08-01 | End: 2024-08-01

## 2024-08-01 RX ORDER — ATENOLOL 25 MG/1
25 TABLET ORAL DAILY
Qty: 30 TABLET | Refills: 0 | Status: SHIPPED | OUTPATIENT
Start: 2024-08-01 | End: 2024-08-02 | Stop reason: WASHOUT

## 2024-08-01 RX ORDER — ATENOLOL 50 MG/1
25 TABLET ORAL ONCE
Status: COMPLETED | OUTPATIENT
Start: 2024-08-01 | End: 2024-08-01

## 2024-08-01 RX ADMIN — CLONIDINE HYDROCHLORIDE 0.2 MG: 0.1 TABLET ORAL at 16:42

## 2024-08-01 RX ADMIN — ATENOLOL 25 MG: 50 TABLET ORAL at 16:42

## 2024-08-01 ASSESSMENT — PAIN SCALES - GENERAL: PAINLEVEL_OUTOF10: 0 - NO PAIN

## 2024-08-01 ASSESSMENT — PAIN DESCRIPTION - FREQUENCY: FREQUENCY: INTERMITTENT

## 2024-08-01 ASSESSMENT — PAIN - FUNCTIONAL ASSESSMENT: PAIN_FUNCTIONAL_ASSESSMENT: 0-10

## 2024-08-01 ASSESSMENT — PAIN DESCRIPTION - PAIN TYPE: TYPE: ACUTE PAIN

## 2024-08-01 NOTE — ED TRIAGE NOTES
Pt states dizziness and slight chest pain that started 2 days ago. Pt states her blood pressure was elevated at urgent care.

## 2024-08-01 NOTE — ED PROVIDER NOTES
HPI   Chief Complaint   Patient presents with    Dizziness    Hypertension       68-year-old female presents today with hypertension and weakness.  She has been noncompliant with her antihypertensive due to multiple allergies to medication.  She presently is on Zebeta 5 mg but she told her primary that she could not comply with it due to digestive been having restless night sleep.  They told her to cut this about in half.  Zebeta 5 mg but she told her primary that she could not comply with it due to digestive and having restless night sleep.  They told her to cut this about in half.  She was on metoprolol could not tolerate.  She was on lisinopril cannot tolerate.  She was on amlodipine could not tolerate.  She has other allergies to contrast dye, latex, meperidine, and she presently has no neurologic deficit.  She is denying chest pain or dyspnea.  She is denying abdominal pain, nausea, or vomiting.  She able to ambulate under her own strength.  She went into Dr. Price's office her cardiologist today and they indicated that they can take her as a walk-in that she was too hypertensive and he was not available.  They sent her to our emergency department.      History provided by:  Patient and spouse   used: No            Patient History   No past medical history on file.  Past Surgical History:   Procedure Laterality Date    BREAST BIOPSY  2014    Biopsy Breast Open     SECTION, CLASSIC  2014     Section    TUBAL LIGATION  2014    Tubal Ligation     No family history on file.  Social History     Tobacco Use    Smoking status: Never    Smokeless tobacco: Never   Substance Use Topics    Alcohol use: Never    Drug use: Never       Physical Exam   ED Triage Vitals [24 1512]   Temperature Heart Rate Respirations BP   36.5 °C (97.7 °F) 88 18 (!) 210/103      Pulse Ox Temp Source Heart Rate Source Patient Position   99 % Temporal Monitor --      BP Location FiO2  (%)     -- --       Physical Exam  Constitutional:       Appearance: Normal appearance.   HENT:      Head: Normocephalic and atraumatic.      Right Ear: Tympanic membrane normal.      Left Ear: Tympanic membrane normal.      Nose: Nose normal.      Mouth/Throat:      Mouth: Mucous membranes are dry.   Eyes:      Extraocular Movements: Extraocular movements intact.      Pupils: Pupils are equal, round, and reactive to light.   Cardiovascular:      Rate and Rhythm: Normal rate and regular rhythm.      Pulses: Normal pulses.      Heart sounds: Normal heart sounds.   Pulmonary:      Effort: Pulmonary effort is normal.      Breath sounds: Normal breath sounds.   Abdominal:      General: Abdomen is flat.      Palpations: Abdomen is soft.   Musculoskeletal:         General: Normal range of motion.      Cervical back: Normal range of motion and neck supple.   Skin:     General: Skin is warm.      Capillary Refill: Capillary refill takes less than 2 seconds.   Neurological:      General: No focal deficit present.      Mental Status: She is alert and oriented to person, place, and time.   Psychiatric:         Mood and Affect: Mood normal.         Behavior: Behavior normal.           ED Course & MDM   Diagnoses as of 08/01/24 1716   Secondary hypertension                       [place] Coma Scale Score: 15         NIH Stroke Scale: 0                Medical Decision Making  NIH was 0 and stroke that was negative.  After multiple discussions with patient we finally agreed on atenolol and she will stop her present Zebeta medication.  I gave her 0.2 mg of clonidine we watched in our emergency department.  We will recheck her vital signs after medications been administered for approximately an hour.  Her troponin was undetectable.  Her BMP was normal.  Her CBC was also baseline for patient.  Her chest x-ray showed no evidence of an acute cardiopulmonary process and at this time she did not appear to have any neurologic deficit.  I  wrote to Dr. Price and advised him what our plans were and I also staffed patient with attending.  After only half hour from medication administration blood pressure was rechecked and was 188/105.  SpO2 was 100% heart rate was 60 bpm.  Patient appears to tolerate atenolol.  I sent 30-day supply to her pharmacy.  I requested appointment with primary and gave them primary contact information.  I wrote to her cardiologist as well.  Advised her cardiologist that were moving to atenolol and stopping her present medication which she cannot tolerate.  There was no neurologic deficit.        Procedure  Procedures     Bam Hinojosa, ANTHONY-ELSY  08/01/24 [id number]

## 2024-08-02 ENCOUNTER — OFFICE VISIT (OUTPATIENT)
Dept: PRIMARY CARE | Facility: CLINIC | Age: 69
End: 2024-08-02
Payer: COMMERCIAL

## 2024-08-02 DIAGNOSIS — I10 BENIGN ESSENTIAL HYPERTENSION: Primary | ICD-10-CM

## 2024-08-02 PROCEDURE — 1160F RVW MEDS BY RX/DR IN RCRD: CPT | Performed by: INTERNAL MEDICINE

## 2024-08-02 PROCEDURE — 1159F MED LIST DOCD IN RCRD: CPT | Performed by: INTERNAL MEDICINE

## 2024-08-02 PROCEDURE — 99213 OFFICE O/P EST LOW 20 MIN: CPT | Performed by: INTERNAL MEDICINE

## 2024-08-02 PROCEDURE — 1123F ACP DISCUSS/DSCN MKR DOCD: CPT | Performed by: INTERNAL MEDICINE

## 2024-08-02 PROCEDURE — 1036F TOBACCO NON-USER: CPT | Performed by: INTERNAL MEDICINE

## 2024-08-02 RX ORDER — HYDROCHLOROTHIAZIDE 25 MG/1
25 TABLET ORAL DAILY
Qty: 30 TABLET | Refills: 1 | Status: SHIPPED | OUTPATIENT
Start: 2024-08-02 | End: 2024-10-01

## 2024-08-02 NOTE — PROGRESS NOTES
Subjective   Patient ID: Sariah Ramos is a 68 y.o. female who presents for No chief complaint on file..    HPI     Patient is a 68-year-old female with past medical history of hypertension atrial fibrillation status postcardiac ablation who presents as follow-up from ER visit.  Patient was previously seeing Dr. Rhoades however has an upcoming appointment to see Dr. Kiley Topete.  In the meantime however she was seen in the emergency room yesterday for hypertension.  Her blood pressure was greater than 200.  She has previously been on bisoprolol and in the emergency room was switched to atenolol.  Her blood pressure is still elevated today but in the 1 50-1 60 systolic range.  She states that she checked her blood pressure this morning and it was 118 systolic.  She has an appointment to see cardiologist later on this month        Review of Systems  Constitutional: No fever or chills  Cardiovascular: no chest pain, no palpitations and no syncope.   Respiratory: no cough, no shortness of breath during exertion and no shortness of breath at rest.   Gastrointestinal: no abdominal pain, no nausea and no vomiting.  Neuro: No Headache, no dizziness    Objective   There were no vitals taken for this visit.    Physical Exam  Constitutional: Alert and in no acute distress. Well developed, well nourished  Head and Face: Head and face: Normal.    Cardiovascular: Heart rate and rhythm were normal, normal S1 and S2. No peripheral edema.   Pulmonary: No respiratory distress. Clear bilateral breath sounds.  Musculoskeletal: Gait and station: Normal. Muscle strength/tone: Normal.   Skin: Normal skin color and pigmentation, normal skin turgor, and no rash.    Psychiatric: Judgment and insight: Intact. Mood and affect: Normal.    Procedures    Lab Results   Component Value Date    WBC 4.2 (L) 08/01/2024    HGB 15.5 08/01/2024    HCT 48.3 (H) 08/01/2024     08/01/2024    CHOL 173 05/22/2023    TRIG 50 05/22/2023    HDL 95.7  05/22/2023    ALT CANCELED 08/24/2023    AST CANCELED 08/24/2023     08/01/2024    K 3.7 08/01/2024     08/01/2024    CREATININE 0.77 08/01/2024    BUN 19 08/01/2024    CO2 28 08/01/2024    TSH 0.72 05/22/2023    HGBA1C 5.5 05/22/2023       ECG 12 lead  Normal sinus rhythm  Possible Left atrial enlargement  Rightward axis  Septal infarct (cited on or before 31-JUL-2023)  Abnormal ECG  When compared with ECG of 18-SEP-2023 14:40,  Premature atrial complexes are no longer Present  See ED provider note for full interpretation and clinical correlation  Confirmed by Lori Hedrick ([id number]) on 8/1/2024 5:30:27 PM  XR chest 2 views  Narrative: Interpreted By:  Kalia Hunt,   STUDY:  XR CHEST 2 VIEWS;  8/1/2024 3:37 pm      INDICATION:  Signs/Symptoms:hypertension.      COMPARISON:  None.      ACCESSION NUMBER(S):  [id number]      ORDERING CLINICIAN:  ALEX VILLA      FINDINGS:                  CARDIOMEDIASTINAL SILHOUETTE:  Cardiomediastinal silhouette is normal in size and configuration.      LUNGS:  Lungs are clear.      ABDOMEN:  No remarkable upper abdominal findings.      BONES:  No acute osseous changes.      Impression: 1.  No evidence of acute cardiopulmonary process.              MACRO:  None      Signed by: Kalia Hunt 8/1/2024 3:38 PM  Dictation workstation:   [id number]            Assessment/Plan   Problem List Items Addressed This Visit             ICD-10-CM    Benign essential hypertension - Primary I10    Relevant Medications    hydroCHLOROthiazide (HYDRODiuril) 25 mg tablet     Given the low heart rate we will stop the atenolol and switch to hydrochlorothiazide.  Monitor ambulatory blood pressure and advised low-salt diet.  Follow-up with cardiology on August 20 for repeat blood pressure and evaluation and then follow-up with your new primary care doctor for continued management

## 2024-08-12 PROBLEM — R63.4 UNINTENDED WEIGHT LOSS: Status: ACTIVE | Noted: 2024-08-12

## 2024-08-12 PROBLEM — M25.559 ARTHRALGIA OF HIP: Status: ACTIVE | Noted: 2024-08-12

## 2024-08-12 PROBLEM — M25.519 SHOULDER PAIN: Status: ACTIVE | Noted: 2024-08-12

## 2024-08-12 PROBLEM — M79.676 PAIN OF TOE: Status: ACTIVE | Noted: 2024-08-12

## 2024-08-12 RX ORDER — VIT C/E/ZN/COPPR/LUTEIN/ZEAXAN 250MG-90MG
25 CAPSULE ORAL DAILY
COMMUNITY
End: 2024-08-13 | Stop reason: WASHOUT

## 2024-08-13 ENCOUNTER — OFFICE VISIT (OUTPATIENT)
Dept: CARDIOLOGY | Facility: CLINIC | Age: 69
End: 2024-08-13
Payer: COMMERCIAL

## 2024-08-13 VITALS
HEART RATE: 69 BPM | DIASTOLIC BLOOD PRESSURE: 92 MMHG | HEIGHT: 67 IN | SYSTOLIC BLOOD PRESSURE: 162 MMHG | WEIGHT: 124.5 LBS | BODY MASS INDEX: 19.54 KG/M2 | OXYGEN SATURATION: 98 %

## 2024-08-13 DIAGNOSIS — I10 BENIGN ESSENTIAL HYPERTENSION: Primary | ICD-10-CM

## 2024-08-13 PROCEDURE — 3077F SYST BP >= 140 MM HG: CPT | Performed by: INTERNAL MEDICINE

## 2024-08-13 PROCEDURE — 1126F AMNT PAIN NOTED NONE PRSNT: CPT | Performed by: INTERNAL MEDICINE

## 2024-08-13 PROCEDURE — 1123F ACP DISCUSS/DSCN MKR DOCD: CPT | Performed by: INTERNAL MEDICINE

## 2024-08-13 PROCEDURE — 3080F DIAST BP >= 90 MM HG: CPT | Performed by: INTERNAL MEDICINE

## 2024-08-13 PROCEDURE — 1159F MED LIST DOCD IN RCRD: CPT | Performed by: INTERNAL MEDICINE

## 2024-08-13 PROCEDURE — 3008F BODY MASS INDEX DOCD: CPT | Performed by: INTERNAL MEDICINE

## 2024-08-13 PROCEDURE — 1036F TOBACCO NON-USER: CPT | Performed by: INTERNAL MEDICINE

## 2024-08-13 PROCEDURE — 99214 OFFICE O/P EST MOD 30 MIN: CPT | Performed by: INTERNAL MEDICINE

## 2024-08-13 RX ORDER — TRIAMTERENE/HYDROCHLOROTHIAZID 37.5-25 MG
1 TABLET ORAL DAILY
Qty: 90 TABLET | Refills: 3 | Status: SHIPPED | OUTPATIENT
Start: 2024-08-13 | End: 2025-08-13

## 2024-08-13 ASSESSMENT — ENCOUNTER SYMPTOMS
VOMITING: 0
HEMOPTYSIS: 0
HEMATURIA: 0
FEVER: 0
MEMORY LOSS: 0
DEPRESSION: 0
ALTERED MENTAL STATUS: 0
MYALGIAS: 0
DYSURIA: 0
BLOATING: 0
WHEEZING: 0
HEADACHES: 0
ABDOMINAL PAIN: 0
DEPRESSION: 1
CHILLS: 0
DIARRHEA: 0
OCCASIONAL FEELINGS OF UNSTEADINESS: 0
NAUSEA: 0
LOSS OF SENSATION IN FEET: 0
COUGH: 0
FALLS: 0
CONSTIPATION: 0

## 2024-08-13 ASSESSMENT — PATIENT HEALTH QUESTIONNAIRE - PHQ9
1. LITTLE INTEREST OR PLEASURE IN DOING THINGS: NOT AT ALL
2. FEELING DOWN, DEPRESSED OR HOPELESS: NOT AT ALL
SUM OF ALL RESPONSES TO PHQ9 QUESTIONS 1 AND 2: 0

## 2024-08-13 ASSESSMENT — COLUMBIA-SUICIDE SEVERITY RATING SCALE - C-SSRS
1. IN THE PAST MONTH, HAVE YOU WISHED YOU WERE DEAD OR WISHED YOU COULD GO TO SLEEP AND NOT WAKE UP?: NO
6. HAVE YOU EVER DONE ANYTHING, STARTED TO DO ANYTHING, OR PREPARED TO DO ANYTHING TO END YOUR LIFE?: NO
2. HAVE YOU ACTUALLY HAD ANY THOUGHTS OF KILLING YOURSELF?: NO

## 2024-08-13 ASSESSMENT — PAIN SCALES - GENERAL: PAINLEVEL: 0-NO PAIN

## 2024-08-13 NOTE — PATIENT INSTRUCTIONS
Change hydrochlorothiazide to Triam-hydrochlorothiazide     Goal BP at least <140/90, optimal <130/80

## 2024-08-13 NOTE — PROGRESS NOTES
Chief Complaint   Patient presents with    Follow-up    Atrial Fibrillation    Hypertension       HPI  67 yo BF w/ h/o AFIB (dx'ed 5/23) s/p DCCV 8/23, HTN now here for cardiology f/u. She continues to feel good (no change after DCCV). No chest pain. No dyspnea. +occ palps x seconds only when startled. +occ LH/dizziness (less off BB and on reduced HCTZ). No syncope. +occ min LE edema. No claudication. No cough.   Originally when dx'ed with AFIB, she was having KIRK and palps. Since adding BB, no sig KIRK/palps.   BP at home: 120s/80s, HR 50s (was 90s in AFIB) [pt claims cuff correlated before]  ECG 12/18: SR (72)  ECG 5/22: SB (58)  ECG 5/23: AFIB (106), ?SMI  ECG 8/23: AFIB (124), low volt, ?ASMI  ECG 8/23: SR (67), PAC, low volt, ?SMI  ECG 9/23: SB (55), PACs, ?SMI  ECG 8/24: SR (80), ?LAE, ?SMI  Echo 6/23: EF 65-70%, mild LAE, mild-mod TR [AFIB]   CCS 4/24: 0, nl heart size, no peric eff, AsAo 3.2cm  CXR 8/24: no acute abnl    Review of Systems   Constitutional: Negative for chills, fever and malaise/fatigue.   HENT:  Negative for hearing loss.    Eyes:  Negative for visual disturbance.   Respiratory:  Negative for cough, hemoptysis and wheezing.    Skin:  Negative for rash.   Musculoskeletal:  Negative for falls and myalgias.   Gastrointestinal:  Negative for bloating, abdominal pain, constipation, diarrhea, dysphagia, nausea and vomiting.   Genitourinary:  Negative for dysuria and hematuria.   Neurological:  Negative for headaches.   Psychiatric/Behavioral:  Negative for altered mental status, depression and memory loss.       Social History     Tobacco Use    Smoking status: Never    Smokeless tobacco: Never   Substance Use Topics    Alcohol use: Never      No pertinent FHx    Allergies   Allergen Reactions    Iodinated Contrast Media Unknown    Latex Itching    Lisinopril Unknown    Meperidine Unknown      Current Outpatient Medications   Medication Instructions    hydroCHLOROthiazide (HYDRODIURIL) 25 mg, oral,  Daily    MULTIVITAMIN ORAL 1 tablet, oral, Daily      Vitals:    08/13/24 1435   BP: (!) 162/92   Pulse:    SpO2:       Physical Exam  Constitutional:       Appearance: Normal appearance.   HENT:      Head: Normocephalic and atraumatic.      Nose: Nose normal.   Neck:      Vascular: No carotid bruit.   Cardiovascular:      Rate and Rhythm: Normal rate and regular rhythm.      Heart sounds: No murmur heard.  Pulmonary:      Effort: Pulmonary effort is normal.      Breath sounds: Normal breath sounds.   Abdominal:      Palpations: Abdomen is soft.      Tenderness: There is no abdominal tenderness.   Musculoskeletal:      Right lower leg: No edema.      Left lower leg: No edema.   Skin:     General: Skin is warm and dry.   Neurological:      General: No focal deficit present.      Mental Status: She is alert.   Psychiatric:         Mood and Affect: Mood normal.         Judgment: Judgment normal.        Results/Data  8/24 Cr 0.77, K 3.7, HGB 15.5, , HSTPN <3  5/23 Cr 0.82, K 5.0, LFT nl, LDL 67, HDL 96, TG 50, Chol 173, HGB 15.3, , hgba1c 5.5, TSH 0.72     Assessment/Plan   67 yo BF w/ h/o AFIB (dx'ed 5/23) s/p DCCV 8/23, HTN. Doing well. Remains in SR. Get walk-in EKG (and resume Eliquis) if palps or higher HR at home. Consider sleep study, simba if AFIB recurs and/or snoring/fatigue.  -change hydrochlorothiazide 12.5 every day to triam-hydrochlorothiazide 37.5-25 1/2 every day (she claims dizzy on full tab of hydrochlorothiazide); felt poor on BB  -f/u 6 months (earlier if needed)    Steve Price MD

## 2024-08-20 ENCOUNTER — APPOINTMENT (OUTPATIENT)
Dept: CARDIOLOGY | Facility: CLINIC | Age: 69
End: 2024-08-20
Payer: COMMERCIAL

## 2024-09-05 ENCOUNTER — APPOINTMENT (OUTPATIENT)
Dept: PRIMARY CARE | Facility: CLINIC | Age: 69
End: 2024-09-05
Payer: COMMERCIAL

## 2024-09-05 ENCOUNTER — LAB (OUTPATIENT)
Dept: LAB | Facility: LAB | Age: 69
End: 2024-09-05
Payer: COMMERCIAL

## 2024-09-05 VITALS
HEIGHT: 67 IN | HEART RATE: 65 BPM | OXYGEN SATURATION: 97 % | BODY MASS INDEX: 19.62 KG/M2 | SYSTOLIC BLOOD PRESSURE: 168 MMHG | DIASTOLIC BLOOD PRESSURE: 100 MMHG | WEIGHT: 125 LBS

## 2024-09-05 DIAGNOSIS — Z12.31 VISIT FOR SCREENING MAMMOGRAM: ICD-10-CM

## 2024-09-05 DIAGNOSIS — I15.9 SECONDARY HYPERTENSION: ICD-10-CM

## 2024-09-05 DIAGNOSIS — I10 ACCELERATED HYPERTENSION: ICD-10-CM

## 2024-09-05 DIAGNOSIS — Z91.89 AT RISK FOR SLEEP APNEA: ICD-10-CM

## 2024-09-05 DIAGNOSIS — I10 ACCELERATED HYPERTENSION: Primary | ICD-10-CM

## 2024-09-05 LAB
CREAT UR-MCNC: 143.6 MG/DL (ref 20–320)
MICROALBUMIN UR-MCNC: 11.9 MG/L
MICROALBUMIN/CREAT UR: 8.3 UG/MG CREAT

## 2024-09-05 PROCEDURE — 3080F DIAST BP >= 90 MM HG: CPT | Performed by: INTERNAL MEDICINE

## 2024-09-05 PROCEDURE — 3077F SYST BP >= 140 MM HG: CPT | Performed by: INTERNAL MEDICINE

## 2024-09-05 PROCEDURE — 1160F RVW MEDS BY RX/DR IN RCRD: CPT | Performed by: INTERNAL MEDICINE

## 2024-09-05 PROCEDURE — 82570 ASSAY OF URINE CREATININE: CPT

## 2024-09-05 PROCEDURE — 1036F TOBACCO NON-USER: CPT | Performed by: INTERNAL MEDICINE

## 2024-09-05 PROCEDURE — 3008F BODY MASS INDEX DOCD: CPT | Performed by: INTERNAL MEDICINE

## 2024-09-05 PROCEDURE — 82043 UR ALBUMIN QUANTITATIVE: CPT

## 2024-09-05 PROCEDURE — 1123F ACP DISCUSS/DSCN MKR DOCD: CPT | Performed by: INTERNAL MEDICINE

## 2024-09-05 PROCEDURE — 1159F MED LIST DOCD IN RCRD: CPT | Performed by: INTERNAL MEDICINE

## 2024-09-05 PROCEDURE — 99214 OFFICE O/P EST MOD 30 MIN: CPT | Performed by: INTERNAL MEDICINE

## 2024-09-05 RX ORDER — LOSARTAN POTASSIUM 25 MG/1
25 TABLET ORAL DAILY
Qty: 90 TABLET | Refills: 1 | Status: SHIPPED | OUTPATIENT
Start: 2024-09-05

## 2024-09-05 NOTE — PROGRESS NOTES
"Subjective   Patient ID: Sariah Ramos is a 68 y.o. female who presents for Establish Care (New patient here to [facility] care).    HPI   The patient is 68 year old woman with PMH significant for HTN who presents to establish medical care.  Recently treated for atrial fibrillation with cardioversion, in normal sinus rhythm since 8/23.  Reports a history of intolerance of multiple medications, \"I changed my BP medications a lot over the years\".    Review of Systems  Negative   Objective   BP (!) 168/100   Pulse 65   Ht 1.689 m (5' 6.5\")   Wt 56.7 kg (125 lb)   SpO2 97%   BMI 19.87 kg/m²     Physical Exam  NAD. Cooperative.  Neck: WNL  Lungs CTA  Heart: RRR  Abdomen: WNL    Assessment/Plan   Diagnoses and all orders for this visit:  Accelerated hypertension  -     losartan (Cozaar) 25 mg tablet; Take 1 tablet (25 mg) by mouth once daily.  -     Albumin-Creatinine Ratio, Urine Random; Future  Visit for screening mammogram  -     BI mammo bilateral screening tomosynthesis; Future  Secondary hypertension  -     Referral to Primary Care  -     Referral to Primary Care  At risk for sleep apnea  -     Referral to Adult Sleep Medicine; Future    Risks and benefits of the prescribed medications were discussed.   Discussed uncontrolled HTN, BP goals were and cardiovascular complications due to uncontrolled state. Recommended to initiate a new antihypertensive, Losartan 25 mg daily, in addition to the current medication regimen, healthy life style, DASH eating diet. Home BP monitor validation in the office, if available. Close office BP monitoring in 3 weeks.  Reviewed medical records, from 5/23 to 8/24, discussed with the patient.         "

## 2024-09-17 ENCOUNTER — APPOINTMENT (OUTPATIENT)
Dept: PRIMARY CARE | Facility: CLINIC | Age: 69
End: 2024-09-17
Payer: COMMERCIAL

## 2024-09-23 ENCOUNTER — APPOINTMENT (OUTPATIENT)
Dept: PRIMARY CARE | Facility: CLINIC | Age: 69
End: 2024-09-23
Payer: COMMERCIAL

## 2024-09-23 VITALS
SYSTOLIC BLOOD PRESSURE: 180 MMHG | OXYGEN SATURATION: 96 % | BODY MASS INDEX: 20.19 KG/M2 | WEIGHT: 127 LBS | HEART RATE: 67 BPM | DIASTOLIC BLOOD PRESSURE: 99 MMHG

## 2024-09-23 DIAGNOSIS — I10 HYPERTENSION, UNSPECIFIED TYPE: Primary | ICD-10-CM

## 2024-09-23 DIAGNOSIS — I48.91 ATRIAL FIBRILLATION, UNSPECIFIED TYPE (MULTI): ICD-10-CM

## 2024-09-23 PROCEDURE — 99214 OFFICE O/P EST MOD 30 MIN: CPT | Performed by: INTERNAL MEDICINE

## 2024-09-23 PROCEDURE — 1123F ACP DISCUSS/DSCN MKR DOCD: CPT | Performed by: INTERNAL MEDICINE

## 2024-09-23 PROCEDURE — 1160F RVW MEDS BY RX/DR IN RCRD: CPT | Performed by: INTERNAL MEDICINE

## 2024-09-23 PROCEDURE — 1036F TOBACCO NON-USER: CPT | Performed by: INTERNAL MEDICINE

## 2024-09-23 PROCEDURE — 3080F DIAST BP >= 90 MM HG: CPT | Performed by: INTERNAL MEDICINE

## 2024-09-23 PROCEDURE — 3077F SYST BP >= 140 MM HG: CPT | Performed by: INTERNAL MEDICINE

## 2024-09-23 PROCEDURE — 1159F MED LIST DOCD IN RCRD: CPT | Performed by: INTERNAL MEDICINE

## 2024-09-23 RX ORDER — LOSARTAN POTASSIUM 50 MG/1
50 TABLET ORAL DAILY
Qty: 90 TABLET | Refills: 0 | Status: SHIPPED | OUTPATIENT
Start: 2024-09-23

## 2024-09-23 NOTE — PROGRESS NOTES
Subjective   Patient ID: Sariah Ramos is a 68 y.o. female who presents for Follow-up (Patient here for follow-up visit ).    HPI   The patient presents to the office for blood pressure monitoring, the current medication regimen is well tolerated.    Review of Systems  Negative    Objective   BP (!) 179/94   Pulse 67   Wt 57.6 kg (127 lb)   SpO2 96%   BMI 20.19 kg/m²     Physical Exam  NAD. Cooperative.  Lungs CTA  Heart: RRR  LE: negative     Assessment/Plan   Diagnoses and all orders for this visit:  Hypertension, unspecified type  -     losartan (Cozaar) 50 mg tablet; Take 1 tablet (50 mg) by mouth once daily.  Atrial fibrillation, unspecified type (Multi)  Resolved, cardioverted     HTN is uncontrolled, BP goals were discussed.  Recommended to increase Losartan to 50 mg daily in addition to hydrochlorothiazide 25 mg daily, BP monitoring in 4 weeks, recommended to bring her home BP device for validation.

## 2024-09-25 ENCOUNTER — HOSPITAL ENCOUNTER (OUTPATIENT)
Dept: RADIOLOGY | Facility: CLINIC | Age: 69
Discharge: HOME | End: 2024-09-25
Payer: COMMERCIAL

## 2024-09-25 VITALS — WEIGHT: 127 LBS | HEIGHT: 66 IN | BODY MASS INDEX: 20.41 KG/M2

## 2024-09-25 DIAGNOSIS — Z12.31 VISIT FOR SCREENING MAMMOGRAM: ICD-10-CM

## 2024-09-25 PROCEDURE — 77063 BREAST TOMOSYNTHESIS BI: CPT | Performed by: RADIOLOGY

## 2024-09-25 PROCEDURE — 77067 SCR MAMMO BI INCL CAD: CPT | Performed by: RADIOLOGY

## 2024-09-25 PROCEDURE — 77067 SCR MAMMO BI INCL CAD: CPT

## 2024-09-30 ENCOUNTER — APPOINTMENT (OUTPATIENT)
Dept: OBSTETRICS AND GYNECOLOGY | Facility: CLINIC | Age: 69
End: 2024-09-30
Payer: COMMERCIAL

## 2024-09-30 VITALS
BODY MASS INDEX: 19.46 KG/M2 | DIASTOLIC BLOOD PRESSURE: 84 MMHG | SYSTOLIC BLOOD PRESSURE: 152 MMHG | HEIGHT: 67 IN | WEIGHT: 124 LBS

## 2024-09-30 DIAGNOSIS — Z01.419 WELL WOMAN EXAM: Primary | ICD-10-CM

## 2024-09-30 DIAGNOSIS — N90.89 VULVAR LESION: ICD-10-CM

## 2024-09-30 PROCEDURE — 1159F MED LIST DOCD IN RCRD: CPT | Performed by: OBSTETRICS & GYNECOLOGY

## 2024-09-30 PROCEDURE — 3079F DIAST BP 80-89 MM HG: CPT | Performed by: OBSTETRICS & GYNECOLOGY

## 2024-09-30 PROCEDURE — 3077F SYST BP >= 140 MM HG: CPT | Performed by: OBSTETRICS & GYNECOLOGY

## 2024-09-30 PROCEDURE — 1126F AMNT PAIN NOTED NONE PRSNT: CPT | Performed by: OBSTETRICS & GYNECOLOGY

## 2024-09-30 PROCEDURE — 1036F TOBACCO NON-USER: CPT | Performed by: OBSTETRICS & GYNECOLOGY

## 2024-09-30 PROCEDURE — G0101 CA SCREEN;PELVIC/BREAST EXAM: HCPCS | Performed by: OBSTETRICS & GYNECOLOGY

## 2024-09-30 PROCEDURE — 1160F RVW MEDS BY RX/DR IN RCRD: CPT | Performed by: OBSTETRICS & GYNECOLOGY

## 2024-09-30 PROCEDURE — 3008F BODY MASS INDEX DOCD: CPT | Performed by: OBSTETRICS & GYNECOLOGY

## 2024-09-30 PROCEDURE — 1123F ACP DISCUSS/DSCN MKR DOCD: CPT | Performed by: OBSTETRICS & GYNECOLOGY

## 2024-09-30 ASSESSMENT — ENCOUNTER SYMPTOMS
BLOOD IN STOOL: 0
APPETITE CHANGE: 0
HEMATURIA: 0
FREQUENCY: 0
BACK PAIN: 0
ABDOMINAL PAIN: 0
SLEEP DISTURBANCE: 0
FEVER: 0
SHORTNESS OF BREATH: 0
DYSURIA: 0
FATIGUE: 0
CHILLS: 0
CONSTIPATION: 0
UNEXPECTED WEIGHT CHANGE: 0
VOMITING: 0
ABDOMINAL DISTENTION: 0
FLANK PAIN: 0
COLOR CHANGE: 0
NAUSEA: 0
DIARRHEA: 0

## 2024-09-30 ASSESSMENT — PAIN SCALES - GENERAL: PAINLEVEL: 0-NO PAIN

## 2024-09-30 NOTE — PROGRESS NOTES
"History Of Present Illness  Routine Gyn Exam  Sariah R Richard here for routine WWE.  Pt is postmenopausal.  Denies spotting or bleeding.     Concerns: none.     Medical and surgical histories reviewed with patient.   Exercise: regular .     Gynecologic History  Postmenopausal.  Sexually active: Yes with one male partner/ .  Last Pap:  ASCUS, HPV negative .   Last mammogram: .   Last Colonoscopy:  pt reports she is due and working on scheduling .   Last DEXA: .     Obstetric History  OB History    Para Term  AB Living   4 4 4         SAB IAB Ectopic Multiple Live Births                  # Outcome Date GA Lbr Temo/2nd Weight Sex Type Anes PTL Lv   4 Term            3 Term            2 Term            1 Term                 Review of Systems   Constitutional:  Negative for appetite change, chills, fatigue, fever and unexpected weight change.   Respiratory:  Negative for shortness of breath.    Cardiovascular:  Negative for chest pain.   Gastrointestinal:  Negative for abdominal distention, abdominal pain, blood in stool, constipation, diarrhea, nausea and vomiting.   Endocrine: Negative for cold intolerance and heat intolerance.   Genitourinary:  Negative for dyspareunia, dysuria, flank pain, frequency, genital sores, hematuria, menstrual problem, pelvic pain, urgency, vaginal bleeding, vaginal discharge and vaginal pain.   Musculoskeletal:  Negative for back pain.   Skin:  Negative for color change.   Psychiatric/Behavioral:  Negative for sleep disturbance.        /84 (BP Location: Left arm, Patient Position: Sitting)   Ht 1.689 m (5' 6.5\")   Wt 56.2 kg (124 lb)   BMI 19.71 kg/m²      Physical Exam  Constitutional:       Appearance: Normal appearance.   HENT:      Head: Normocephalic and atraumatic.   Chest:   Breasts:     Right: Normal.      Left: Normal.   Abdominal:      General: Abdomen is flat.      Palpations: Abdomen is soft.      Tenderness: There is no abdominal " tenderness.   Genitourinary:     General: Normal vulva.      Vagina: Normal.      Cervix: Normal.      Uterus: Normal.       Adnexa: Right adnexa normal and left adnexa normal.          Comments: 3 hypopigmented plaques on inner right labia   Skin:     General: Skin is warm and dry.   Neurological:      Mental Status: She is alert and oriented to person, place, and time.   Psychiatric:         Mood and Affect: Mood normal.              Assessment/Plan         Routine Well Woman Exam Today  Discussed diet and exercise.   Reviewed routine health screenings.   Pap: 2023 ASCUS, HPV negative ; repeat 2026  Recommend annual mammograms.      Vulvar lesion  Recommend biopsy  Pt will schedule and return for biopsy at her convenience          Tatyana Bourgeois MD

## 2024-10-02 ENCOUNTER — OFFICE VISIT (OUTPATIENT)
Dept: OBSTETRICS AND GYNECOLOGY | Facility: CLINIC | Age: 69
End: 2024-10-02
Payer: COMMERCIAL

## 2024-10-02 VITALS
WEIGHT: 121 LBS | SYSTOLIC BLOOD PRESSURE: 140 MMHG | DIASTOLIC BLOOD PRESSURE: 88 MMHG | HEIGHT: 67 IN | BODY MASS INDEX: 18.99 KG/M2

## 2024-10-02 DIAGNOSIS — N90.89 VULVAR LESION: Primary | ICD-10-CM

## 2024-10-02 PROCEDURE — 1123F ACP DISCUSS/DSCN MKR DOCD: CPT | Performed by: OBSTETRICS & GYNECOLOGY

## 2024-10-02 PROCEDURE — 3077F SYST BP >= 140 MM HG: CPT | Performed by: OBSTETRICS & GYNECOLOGY

## 2024-10-02 PROCEDURE — 1159F MED LIST DOCD IN RCRD: CPT | Performed by: OBSTETRICS & GYNECOLOGY

## 2024-10-02 PROCEDURE — 3079F DIAST BP 80-89 MM HG: CPT | Performed by: OBSTETRICS & GYNECOLOGY

## 2024-10-02 PROCEDURE — 3008F BODY MASS INDEX DOCD: CPT | Performed by: OBSTETRICS & GYNECOLOGY

## 2024-10-02 PROCEDURE — 56605 BIOPSY OF VULVA/PERINEUM: CPT | Performed by: OBSTETRICS & GYNECOLOGY

## 2024-10-02 PROCEDURE — 1036F TOBACCO NON-USER: CPT | Performed by: OBSTETRICS & GYNECOLOGY

## 2024-10-02 PROCEDURE — 1126F AMNT PAIN NOTED NONE PRSNT: CPT | Performed by: OBSTETRICS & GYNECOLOGY

## 2024-10-02 ASSESSMENT — PAIN SCALES - GENERAL: PAINLEVEL: 0-NO PAIN

## 2024-10-02 NOTE — PROGRESS NOTES
"Patient ID: Sariah Ramos is a 68 y.o. female.  Pt presents for vulvar biopsy after concerning findings on vulva during routine annual exam recently.   Currently asymptomatic.     Visit Vitals  /88 (BP Location: Left arm, Patient Position: Sitting)   Ht 1.689 m (5' 6.5\")   Wt 54.9 kg (121 lb)   BMI 19.24 kg/m²   OB Status Postmenopausal   Smoking Status Never   BSA 1.6 m²       Biopsy vulva    Date/Time: 10/2/2024 2:40 PM    Performed by: Tatyana THAKKRA MD  Authorized by: Tatyana THAKKAR MD    Procedure Overview:     Procedure type: punch biopsy of skin      # of biopsies taken:  1  Consent:     Consent obtained:  Verbal    Consent given by:  Patient    Risks & benefits discussed with patient: Yes    Universal Protocol:     Procedure explained and questions answered to patient or proxy's satisfaction: yes      Test results available and properly labeled: yes      Relevant documents present and verified: yes      Immediately prior to procedure a time out was called: yes      Patient identity confirmed:  Verbally with patient  Indication:     Indications: lesion and pigmentation change    Pre-procedure Details:     Prepped with: betadine      Local anesthetic:  Lidocaine 1% WITH epi    Total amount used (mL):  2.5  Procedure Details:     Location 1: labia majora (R)      Location 1 comment:  Upper portion of inner right labia majora    Size (mm):  4    Hemostasis: pressure and suture    Post-procedure Details:     Patient tolerance of procedure:  Tolerated well, no immediate complications    Specimen sent to pathology: Yes        Vulvar biopsy completed without difficulty   Biopsy site care reviewed - wash normally with soap and water, pat dry, akty activities  Suture should fall out on own  Tylenol and/or Ibuprofen for discomfort , ice pack to area also ok   Will call patient with pathology results   "

## 2024-10-09 LAB
LABORATORY COMMENT REPORT: NORMAL
PATH REPORT.FINAL DX SPEC: NORMAL
PATH REPORT.GROSS SPEC: NORMAL
PATH REPORT.RELEVANT HX SPEC: NORMAL
PATH REPORT.TOTAL CANCER: NORMAL

## 2024-10-23 ENCOUNTER — APPOINTMENT (OUTPATIENT)
Dept: PRIMARY CARE | Facility: CLINIC | Age: 69
End: 2024-10-23
Payer: COMMERCIAL

## 2024-10-23 VITALS
DIASTOLIC BLOOD PRESSURE: 99 MMHG | BODY MASS INDEX: 19.87 KG/M2 | SYSTOLIC BLOOD PRESSURE: 187 MMHG | WEIGHT: 125 LBS | OXYGEN SATURATION: 98 % | HEART RATE: 78 BPM

## 2024-10-23 DIAGNOSIS — I10 HYPERTENSION, UNSPECIFIED TYPE: Primary | ICD-10-CM

## 2024-10-23 DIAGNOSIS — R80.9 ALBUMINURIA: ICD-10-CM

## 2024-10-23 PROCEDURE — 1123F ACP DISCUSS/DSCN MKR DOCD: CPT | Performed by: INTERNAL MEDICINE

## 2024-10-23 PROCEDURE — 3077F SYST BP >= 140 MM HG: CPT | Performed by: INTERNAL MEDICINE

## 2024-10-23 PROCEDURE — 1036F TOBACCO NON-USER: CPT | Performed by: INTERNAL MEDICINE

## 2024-10-23 PROCEDURE — 3080F DIAST BP >= 90 MM HG: CPT | Performed by: INTERNAL MEDICINE

## 2024-10-23 PROCEDURE — 1159F MED LIST DOCD IN RCRD: CPT | Performed by: INTERNAL MEDICINE

## 2024-10-23 PROCEDURE — 1160F RVW MEDS BY RX/DR IN RCRD: CPT | Performed by: INTERNAL MEDICINE

## 2024-10-23 PROCEDURE — 99214 OFFICE O/P EST MOD 30 MIN: CPT | Performed by: INTERNAL MEDICINE

## 2024-10-23 RX ORDER — HYDRALAZINE HYDROCHLORIDE 25 MG/1
25 TABLET, FILM COATED ORAL 2 TIMES DAILY
Qty: 60 TABLET | Refills: 11 | Status: SHIPPED | OUTPATIENT
Start: 2024-10-23

## 2024-10-23 NOTE — PROGRESS NOTES
Subjective   Patient ID: Sariah Ramos is a 69 y.o. female who presents for Follow-up (Patient here for follow-up visit).    HPI   The patient presents to the office for blood pressure monitoring, the Losartan was not tolerated, self discontinued.  History of intolerance to Lisinopril, and Amlodipine.  Home BP monitor was brought for validation in the office.  Review of Systems  Negative   Objective   BP (!) 190/100   Pulse 78   Wt 56.7 kg (125 lb)   SpO2 98%   BMI 19.87 kg/m²     Physical Exam  NAD. Cooperative.  Lungs CTA  Heart: RRR  LE: negative     Assessment/Plan   Diagnoses and all orders for this visit:  Hypertension, unspecified type  -     hydrALAZINE (Apresoline) 25 mg tablet; Take 1 tablet (25 mg) by mouth 2 times a day.  Accelerated HTN  Again discussed accelerated HTN, BP goals and cardiovascular complications due to uncontrolled state. Recommended to initiate Hydralzine 25 mg bid, gradual dose escalation, continuation of Triamterene 37.5 mg-Hydrochlorothiazide 25 mg daily in addition to healthy life style, DASH eating diet. Discussed Spironolactone if the BP remains recalcitrant to the current medication regimen. Home BP monitor was validated in the office. Close BP monitoring at home and in 2 weeks in the office.  Risks and benefits of the prescribed medication were discussed.     Lab results from 8 and 9/24 were reviewed and discussed.

## 2024-10-29 ENCOUNTER — APPOINTMENT (OUTPATIENT)
Dept: SURGERY | Facility: CLINIC | Age: 69
End: 2024-10-29
Payer: COMMERCIAL

## 2024-10-29 VITALS
DIASTOLIC BLOOD PRESSURE: 90 MMHG | SYSTOLIC BLOOD PRESSURE: 165 MMHG | HEART RATE: 97 BPM | BODY MASS INDEX: 20.06 KG/M2 | WEIGHT: 124.8 LBS | TEMPERATURE: 97.2 F | HEIGHT: 66 IN

## 2024-10-29 DIAGNOSIS — R19.09 GROIN MASS: Primary | ICD-10-CM

## 2024-10-29 PROCEDURE — 3077F SYST BP >= 140 MM HG: CPT | Performed by: SURGERY

## 2024-10-29 PROCEDURE — 1123F ACP DISCUSS/DSCN MKR DOCD: CPT | Performed by: SURGERY

## 2024-10-29 PROCEDURE — 1036F TOBACCO NON-USER: CPT | Performed by: SURGERY

## 2024-10-29 PROCEDURE — 99213 OFFICE O/P EST LOW 20 MIN: CPT | Performed by: SURGERY

## 2024-10-29 PROCEDURE — 3080F DIAST BP >= 90 MM HG: CPT | Performed by: SURGERY

## 2024-10-29 PROCEDURE — 1126F AMNT PAIN NOTED NONE PRSNT: CPT | Performed by: SURGERY

## 2024-10-29 PROCEDURE — 1159F MED LIST DOCD IN RCRD: CPT | Performed by: SURGERY

## 2024-10-29 PROCEDURE — 3008F BODY MASS INDEX DOCD: CPT | Performed by: SURGERY

## 2024-10-29 PROCEDURE — 1160F RVW MEDS BY RX/DR IN RCRD: CPT | Performed by: SURGERY

## 2024-10-29 ASSESSMENT — PAIN SCALES - GENERAL: PAINLEVEL_OUTOF10: 0-NO PAIN

## 2024-11-06 ENCOUNTER — APPOINTMENT (OUTPATIENT)
Dept: PRIMARY CARE | Facility: CLINIC | Age: 69
End: 2024-11-06
Payer: COMMERCIAL

## 2024-11-06 VITALS
DIASTOLIC BLOOD PRESSURE: 90 MMHG | OXYGEN SATURATION: 98 % | HEART RATE: 78 BPM | WEIGHT: 124 LBS | BODY MASS INDEX: 20.01 KG/M2 | SYSTOLIC BLOOD PRESSURE: 179 MMHG

## 2024-11-06 DIAGNOSIS — I10 UNCONTROLLED HYPERTENSION: Primary | ICD-10-CM

## 2024-11-06 PROCEDURE — 1036F TOBACCO NON-USER: CPT | Performed by: INTERNAL MEDICINE

## 2024-11-06 PROCEDURE — 1160F RVW MEDS BY RX/DR IN RCRD: CPT | Performed by: INTERNAL MEDICINE

## 2024-11-06 PROCEDURE — 3080F DIAST BP >= 90 MM HG: CPT | Performed by: INTERNAL MEDICINE

## 2024-11-06 PROCEDURE — 1123F ACP DISCUSS/DSCN MKR DOCD: CPT | Performed by: INTERNAL MEDICINE

## 2024-11-06 PROCEDURE — 99214 OFFICE O/P EST MOD 30 MIN: CPT | Performed by: INTERNAL MEDICINE

## 2024-11-06 PROCEDURE — 1159F MED LIST DOCD IN RCRD: CPT | Performed by: INTERNAL MEDICINE

## 2024-11-06 PROCEDURE — 3077F SYST BP >= 140 MM HG: CPT | Performed by: INTERNAL MEDICINE

## 2024-11-06 RX ORDER — HYDRALAZINE HYDROCHLORIDE 50 MG/1
50 TABLET, FILM COATED ORAL 2 TIMES DAILY
Qty: 180 TABLET | Refills: 1 | Status: SHIPPED | OUTPATIENT
Start: 2024-11-06

## 2024-11-06 NOTE — PROGRESS NOTES
"Subjective   Patient ID: Sariah Ramos is a 69 y.o. female who presents for Follow-up (Patient here for follow up visit ).    HPI   The patient presents to the office for blood pressure monitoring, the current medication regimen is well tolerated.  \"I feel better, I sleep better\".  Review of Systems  Negative   Objective   /90   Pulse 78   Wt 56.2 kg (124 lb)   SpO2 98%   BMI 20.01 kg/m²     Physical Exam  NAD. Cooperative.  Lungs CTA  Heart: RRR  LE: negative     Assessment/Plan   Diagnoses and all orders for this visit:  Uncontrolled hypertension  -     hydrALAZINE (Apresoline) 50 mg tablet; Take 1 tablet (50 mg) by mouth 2 times a day.  HTN control is improving, not at optimum, recommended to increase Hydralazine to 50 mg bid and to follow up in 4 weeks in the office.       "

## 2024-11-13 ENCOUNTER — APPOINTMENT (OUTPATIENT)
Dept: PRIMARY CARE | Facility: CLINIC | Age: 69
End: 2024-11-13
Payer: COMMERCIAL

## 2024-12-04 ENCOUNTER — LAB (OUTPATIENT)
Dept: LAB | Facility: LAB | Age: 69
End: 2024-12-04
Payer: COMMERCIAL

## 2024-12-04 ENCOUNTER — APPOINTMENT (OUTPATIENT)
Dept: PRIMARY CARE | Facility: CLINIC | Age: 69
End: 2024-12-04
Payer: COMMERCIAL

## 2024-12-04 VITALS
HEART RATE: 90 BPM | OXYGEN SATURATION: 98 % | WEIGHT: 129 LBS | DIASTOLIC BLOOD PRESSURE: 80 MMHG | BODY MASS INDEX: 20.82 KG/M2 | SYSTOLIC BLOOD PRESSURE: 149 MMHG

## 2024-12-04 DIAGNOSIS — E78.5 HYPERLIPIDEMIA, UNSPECIFIED HYPERLIPIDEMIA TYPE: ICD-10-CM

## 2024-12-04 DIAGNOSIS — R73.01 IFG (IMPAIRED FASTING GLUCOSE): ICD-10-CM

## 2024-12-04 DIAGNOSIS — Z11.59 NEED FOR HEPATITIS C SCREENING TEST: ICD-10-CM

## 2024-12-04 DIAGNOSIS — I10 UNCONTROLLED HYPERTENSION: Primary | ICD-10-CM

## 2024-12-04 LAB
CHOLEST SERPL-MCNC: 164 MG/DL (ref 0–199)
CHOLESTEROL/HDL RATIO: 1.8
EST. AVERAGE GLUCOSE BLD GHB EST-MCNC: 100 MG/DL
HBA1C MFR BLD: 5.1 %
HCV AB SER QL: NONREACTIVE
HDLC SERPL-MCNC: 89 MG/DL
LDLC SERPL CALC-MCNC: 65 MG/DL
NON HDL CHOLESTEROL: 75 MG/DL (ref 0–149)
TRIGL SERPL-MCNC: 51 MG/DL (ref 0–149)
VLDL: 10 MG/DL (ref 0–40)

## 2024-12-04 PROCEDURE — 1036F TOBACCO NON-USER: CPT | Performed by: INTERNAL MEDICINE

## 2024-12-04 PROCEDURE — G0472 HEP C SCREEN HIGH RISK/OTHER: HCPCS

## 2024-12-04 PROCEDURE — 3079F DIAST BP 80-89 MM HG: CPT | Performed by: INTERNAL MEDICINE

## 2024-12-04 PROCEDURE — 1123F ACP DISCUSS/DSCN MKR DOCD: CPT | Performed by: INTERNAL MEDICINE

## 2024-12-04 PROCEDURE — 1159F MED LIST DOCD IN RCRD: CPT | Performed by: INTERNAL MEDICINE

## 2024-12-04 PROCEDURE — 80061 LIPID PANEL: CPT

## 2024-12-04 PROCEDURE — 99214 OFFICE O/P EST MOD 30 MIN: CPT | Performed by: INTERNAL MEDICINE

## 2024-12-04 PROCEDURE — 83036 HEMOGLOBIN GLYCOSYLATED A1C: CPT

## 2024-12-04 PROCEDURE — 3077F SYST BP >= 140 MM HG: CPT | Performed by: INTERNAL MEDICINE

## 2024-12-04 PROCEDURE — 1160F RVW MEDS BY RX/DR IN RCRD: CPT | Performed by: INTERNAL MEDICINE

## 2024-12-04 PROCEDURE — 36415 COLL VENOUS BLD VENIPUNCTURE: CPT

## 2024-12-04 RX ORDER — HYDRALAZINE HYDROCHLORIDE 25 MG/1
25 TABLET, FILM COATED ORAL 3 TIMES DAILY
Qty: 270 TABLET | Refills: 1 | Status: SHIPPED | OUTPATIENT
Start: 2024-12-04

## 2024-12-04 NOTE — PROGRESS NOTES
Subjective   Patient ID: Sariah Ramos is a 69 y.o. female who presents for Follow-up (Patient here for follow-up visit ).    HPI   The patient presents to the office for blood pressure monitoring, the Hydralazine 50 mg bid was not tolerated, reduced to 25 mg bid.    Review of Systems  Negative    Objective   /80   Pulse 90   Wt 58.5 kg (129 lb)   SpO2 98%   BMI 20.82 kg/m²     Physical Exam  NAD. Cooperative.  Lungs CTA  Heart: RRR  LE: negative     Assessment/Plan   Diagnoses and all orders for this visit:  Uncontrolled hypertension  -     hydrALAZINE (Apresoline) 25 mg tablet; Take 1 tablet (25 mg) by mouth 3 times a day.  Hyperlipidemia, unspecified hyperlipidemia type  -     Lipid panel; Future  IFG (impaired fasting glucose)  -     Hemoglobin A1C; Future  Need for hepatitis C screening test  -     Hepatitis C Antibody; Future    BP improved, not at optimum, increase the dose to Hydralazine 25 ng tid, 3 months follow up.

## 2024-12-13 ENCOUNTER — TELEPHONE (OUTPATIENT)
Dept: CARDIOLOGY | Facility: HOSPITAL | Age: 69
End: 2024-12-13
Payer: COMMERCIAL

## 2024-12-16 ENCOUNTER — HOSPITAL ENCOUNTER (OUTPATIENT)
Dept: CARDIOLOGY | Facility: CLINIC | Age: 69
Discharge: HOME | End: 2024-12-16
Payer: COMMERCIAL

## 2024-12-16 DIAGNOSIS — I48.0 PAROXYSMAL ATRIAL FIBRILLATION (MULTI): Primary | ICD-10-CM

## 2024-12-16 PROCEDURE — 93010 ELECTROCARDIOGRAM REPORT: CPT | Performed by: INTERNAL MEDICINE

## 2024-12-16 PROCEDURE — 93005 ELECTROCARDIOGRAM TRACING: CPT

## 2024-12-16 NOTE — TELEPHONE ENCOUNTER
Patient came in for walk in EKG. Dr. Price looked at EKG and normal, no afib. Called patient to let her know.

## 2024-12-16 NOTE — TELEPHONE ENCOUNTER
Called and notified pt Dr. Price ordered walk in EKG to evaluate symptoms. Pt states she will come have EKG completed this afternoon.

## 2024-12-19 LAB
ATRIAL RATE: 96 BPM
P AXIS: 80 DEGREES
P OFFSET: 204 MS
P ONSET: 157 MS
PR INTERVAL: 132 MS
Q ONSET: 223 MS
QRS COUNT: 16 BEATS
QRS DURATION: 70 MS
QT INTERVAL: 332 MS
QTC CALCULATION(BAZETT): 419 MS
QTC FREDERICIA: 388 MS
R AXIS: 100 DEGREES
T AXIS: 61 DEGREES
T OFFSET: 389 MS
VENTRICULAR RATE: 96 BPM

## 2024-12-21 DIAGNOSIS — F41.9 ANXIETY TENSION STATE: Primary | ICD-10-CM

## 2024-12-21 RX ORDER — HYDROXYZINE HYDROCHLORIDE 25 MG/1
12.5-25 TABLET, FILM COATED ORAL EVERY 8 HOURS PRN
Qty: 30 TABLET | Refills: 0 | Status: SHIPPED | OUTPATIENT
Start: 2024-12-21 | End: 2025-01-20

## 2024-12-23 ENCOUNTER — APPOINTMENT (OUTPATIENT)
Dept: SLEEP MEDICINE | Facility: CLINIC | Age: 69
End: 2024-12-23
Payer: COMMERCIAL

## 2025-01-20 ENCOUNTER — APPOINTMENT (OUTPATIENT)
Dept: SLEEP MEDICINE | Facility: CLINIC | Age: 70
End: 2025-01-20
Payer: COMMERCIAL

## 2025-02-11 ENCOUNTER — APPOINTMENT (OUTPATIENT)
Dept: CARDIOLOGY | Facility: CLINIC | Age: 70
End: 2025-02-11
Payer: COMMERCIAL

## 2025-02-11 VITALS
DIASTOLIC BLOOD PRESSURE: 84 MMHG | HEART RATE: 84 BPM | BODY MASS INDEX: 20.04 KG/M2 | SYSTOLIC BLOOD PRESSURE: 156 MMHG | WEIGHT: 127.7 LBS | OXYGEN SATURATION: 97 % | HEIGHT: 67 IN

## 2025-02-11 DIAGNOSIS — I48.91 ATRIAL FIBRILLATION, UNSPECIFIED TYPE (MULTI): Primary | ICD-10-CM

## 2025-02-11 DIAGNOSIS — I10 BENIGN ESSENTIAL HYPERTENSION: ICD-10-CM

## 2025-02-11 PROCEDURE — 1123F ACP DISCUSS/DSCN MKR DOCD: CPT | Performed by: INTERNAL MEDICINE

## 2025-02-11 PROCEDURE — 1036F TOBACCO NON-USER: CPT | Performed by: INTERNAL MEDICINE

## 2025-02-11 PROCEDURE — 3077F SYST BP >= 140 MM HG: CPT | Performed by: INTERNAL MEDICINE

## 2025-02-11 PROCEDURE — 3079F DIAST BP 80-89 MM HG: CPT | Performed by: INTERNAL MEDICINE

## 2025-02-11 PROCEDURE — 1159F MED LIST DOCD IN RCRD: CPT | Performed by: INTERNAL MEDICINE

## 2025-02-11 PROCEDURE — 1126F AMNT PAIN NOTED NONE PRSNT: CPT | Performed by: INTERNAL MEDICINE

## 2025-02-11 PROCEDURE — 3008F BODY MASS INDEX DOCD: CPT | Performed by: INTERNAL MEDICINE

## 2025-02-11 PROCEDURE — 99214 OFFICE O/P EST MOD 30 MIN: CPT | Performed by: INTERNAL MEDICINE

## 2025-02-11 RX ORDER — MULTIVITAMIN/IRON/FOLIC ACID 18MG-0.4MG
1 TABLET ORAL EVERY OTHER DAY
COMMUNITY

## 2025-02-11 RX ORDER — CHOLECALCIFEROL (VITAMIN D3) 25 MCG
1000 TABLET ORAL EVERY OTHER DAY
COMMUNITY

## 2025-02-11 ASSESSMENT — ENCOUNTER SYMPTOMS
FALLS: 0
MEMORY LOSS: 0
DEPRESSION: 0
HEMATURIA: 0
HEMOPTYSIS: 0
VOMITING: 0
OCCASIONAL FEELINGS OF UNSTEADINESS: 0
CHILLS: 0
COUGH: 0
BLOATING: 0
DIARRHEA: 0
DYSURIA: 0
ABDOMINAL PAIN: 0
WHEEZING: 0
CONSTIPATION: 0
MYALGIAS: 0
HEADACHES: 0
ALTERED MENTAL STATUS: 0
LOSS OF SENSATION IN FEET: 0
FEVER: 0
DEPRESSION: 1
NAUSEA: 0

## 2025-02-11 ASSESSMENT — PATIENT HEALTH QUESTIONNAIRE - PHQ9
10. IF YOU CHECKED OFF ANY PROBLEMS, HOW DIFFICULT HAVE THESE PROBLEMS MADE IT FOR YOU TO DO YOUR WORK, TAKE CARE OF THINGS AT HOME, OR GET ALONG WITH OTHER PEOPLE: NOT DIFFICULT AT ALL
SUM OF ALL RESPONSES TO PHQ9 QUESTIONS 1 AND 2: 2
1. LITTLE INTEREST OR PLEASURE IN DOING THINGS: SEVERAL DAYS
2. FEELING DOWN, DEPRESSED OR HOPELESS: SEVERAL DAYS

## 2025-02-11 ASSESSMENT — PAIN SCALES - GENERAL: PAINLEVEL_OUTOF10: 0-NO PAIN

## 2025-02-11 NOTE — PATIENT INSTRUCTIONS
Increase Hydralazine from 25mg 2x/day to 3x/day  Goal BP <140/90 - if high, we can increase Hydralazine dose    If HR >100 at rest, come here for non-urgent walk-in ECG M-F 8-4pm

## 2025-02-11 NOTE — PROGRESS NOTES
Chief Complaint   Patient presents with    Follow-up       HPI  68 yo BF w/ h/o AFIB (dx'ed 5/23) s/p DCCV 8/23, HTN now here for cardiology f/u. She continues to feel good (no change after DCCV). No chest pain. No dyspnea. +occ palps x seconds only when startled. No further LH/dizziness off HCTZ. No syncope. No LE edema, except area below R knee. No claudication. No cough.   Originally when dx'ed with AFIB, she was having KIRK and palps. Since adding BB, no sig KIRK/palps.   BP at home: ~130/80s HR 50s-60s (prev was 90s in AFIB) [pt claims cuff correlated before]  ECG 12/18: SR (72)  ECG 5/22: SB (58)  ECG 5/23: AFIB (106), ?SMI  ECG 8/23: AFIB (124), low volt, ?ASMI  ECG 8/23: SR (67), PAC, low volt, ?SMI  ECG 9/23: SB (55), PACs, ?SMI  ECG 8/24: SR (80), ?LAE, ?SMI  ECG 12/24: SR (96), EMILIA, nonsp ST changes  Echo 6/23: EF 65-70%, mild LAE, mild-mod TR [AFIB]   CCS 4/24: 0, nl heart size, no peric eff, AsAo 3.2cm  CXR 8/24: no acute abnl    Review of Systems   Constitutional: Negative for chills, fever and malaise/fatigue.   HENT:  Negative for hearing loss.    Eyes:  Negative for visual disturbance.   Respiratory:  Negative for cough, hemoptysis and wheezing.    Skin:  Negative for rash.   Musculoskeletal:  Negative for falls and myalgias.   Gastrointestinal:  Negative for bloating, abdominal pain, constipation, diarrhea, dysphagia, nausea and vomiting.   Genitourinary:  Negative for dysuria and hematuria.   Neurological:  Negative for headaches.   Psychiatric/Behavioral:  Negative for altered mental status, depression and memory loss.       Social History     Tobacco Use    Smoking status: Never     Passive exposure: Never    Smokeless tobacco: Never   Substance Use Topics    Alcohol use: Never      No pertinent FHx    Allergies   Allergen Reactions    Bisoprolol Fumarate Cough    Iodinated Contrast Media Unknown    Latex Itching    Lisinopril Unknown    Meperidine Unknown    Triamterene Insomnia      Current  "Outpatient Medications   Medication Instructions    b complex 0.4 mg tablet 1 tablet, Every other day    cholecalciferol (VITAMIN D3) 1,000 Units, Every other day    hydrALAZINE (APRESOLINE) 25 mg, oral, 3 times daily      /84 (BP Location: Right arm, Patient Position: Sitting, BP Cuff Size: Adult)   Pulse 84   Ht 1.689 m (5' 6.5\")   Wt 57.9 kg (127 lb 11.2 oz)   SpO2 97%   BMI 20.30 kg/m²       Physical Exam  Constitutional:       Appearance: Normal appearance.   HENT:      Head: Normocephalic and atraumatic.      Nose: Nose normal.   Neck:      Vascular: No carotid bruit.   Cardiovascular:      Rate and Rhythm: Normal rate and regular rhythm.      Heart sounds: Murmur heard.      Systolic murmur is present with a grade of 1/6.   Pulmonary:      Effort: Pulmonary effort is normal.      Breath sounds: Normal breath sounds.   Abdominal:      Palpations: Abdomen is soft.      Tenderness: There is no abdominal tenderness.   Musculoskeletal:      Right lower leg: No edema.      Left lower leg: No edema.   Skin:     General: Skin is warm and dry.   Neurological:      General: No focal deficit present.      Mental Status: She is alert.   Psychiatric:         Mood and Affect: Mood normal.         Judgment: Judgment normal.        Results/Data  12/24 LDL 65, HDL 89, TG 51, Chol 164, hgba1c 5.1  8/24 Cr 0.77, K 3.7, HGB 15.5, , HSTPN <3  5/23 Cr 0.82, K 5.0, LFT nl, LDL 67, HDL 96, TG 50, Chol 173, HGB 15.3, , hgba1c 5.5, TSH 0.72     Assessment/Plan   70 yo BF w/ h/o AFIB (dx'ed 5/23) s/p DCCV 8/23, HTN. Doing well. Remains in SR. Get walk-in EKG if palps or higher HR at home. Consider sleep study, simba if AFIB recurs and/or snoring/fatigue.  BP high here today; better at home.   -increase Hydralazine from 25 bid to tid (SEs on BB, HCTZ, Amlodipine, Lisinopril)  -f/u 6 months (earlier if needed)    Steve Price MD  "

## 2025-03-04 ENCOUNTER — APPOINTMENT (OUTPATIENT)
Dept: PRIMARY CARE | Facility: CLINIC | Age: 70
End: 2025-03-04
Payer: COMMERCIAL

## 2025-03-04 VITALS
HEART RATE: 79 BPM | SYSTOLIC BLOOD PRESSURE: 130 MMHG | WEIGHT: 126 LBS | BODY MASS INDEX: 20.03 KG/M2 | OXYGEN SATURATION: 97 % | DIASTOLIC BLOOD PRESSURE: 70 MMHG

## 2025-03-04 DIAGNOSIS — I10 HYPERTENSION, UNSPECIFIED TYPE: Primary | ICD-10-CM

## 2025-03-04 DIAGNOSIS — Z12.11 ENCOUNTER FOR SCREENING FOR MALIGNANT NEOPLASM OF COLON: ICD-10-CM

## 2025-03-04 PROCEDURE — G8433 SCR FOR DEP NOT CPT DOC RSN: HCPCS | Performed by: INTERNAL MEDICINE

## 2025-03-04 PROCEDURE — 1160F RVW MEDS BY RX/DR IN RCRD: CPT | Performed by: INTERNAL MEDICINE

## 2025-03-04 PROCEDURE — 1036F TOBACCO NON-USER: CPT | Performed by: INTERNAL MEDICINE

## 2025-03-04 PROCEDURE — 3075F SYST BP GE 130 - 139MM HG: CPT | Performed by: INTERNAL MEDICINE

## 2025-03-04 PROCEDURE — 3078F DIAST BP <80 MM HG: CPT | Performed by: INTERNAL MEDICINE

## 2025-03-04 PROCEDURE — 1123F ACP DISCUSS/DSCN MKR DOCD: CPT | Performed by: INTERNAL MEDICINE

## 2025-03-04 PROCEDURE — 99214 OFFICE O/P EST MOD 30 MIN: CPT | Performed by: INTERNAL MEDICINE

## 2025-03-04 PROCEDURE — 1159F MED LIST DOCD IN RCRD: CPT | Performed by: INTERNAL MEDICINE

## 2025-03-04 RX ORDER — HYDRALAZINE HYDROCHLORIDE 25 MG/1
TABLET, FILM COATED ORAL
Qty: 270 TABLET | Refills: 1 | Status: SHIPPED | OUTPATIENT
Start: 2025-03-04

## 2025-03-04 RX ORDER — HYDRALAZINE HYDROCHLORIDE 25 MG/1
25 TABLET, FILM COATED ORAL 2 TIMES DAILY
Qty: 270 TABLET | Refills: 1 | Status: SHIPPED | OUTPATIENT
Start: 2025-03-04 | End: 2025-03-04 | Stop reason: SDUPTHER

## 2025-03-04 NOTE — PROGRESS NOTES
Subjective   Patient ID: Sariah Ramos is a 69 y.o. female who presents for Follow-up (Patient here for follow-up visit).    HPI   The patient presents to the office for blood pressure monitoring, the current medication regimen is well tolerated, refill request.    Review of Systems  Negative   Objective   /70   Pulse 79   Wt 57.2 kg (126 lb)   SpO2 97%   BMI 20.03 kg/m²     Physical Exam  NAD. Cooperative.  Lungs CTA  Heart: RRR  LE: negative     Assessment/Plan   Diagnoses and all orders for this visit:  Hypertension, unspecified type  -     hydrALAZINE (Apresoline) 25 mg tablet; Take 1 tablet (25 mg) by mouth 2 times a day. 2 tabs or 50 mg in am 1 tab or 25 mg in pm  Encounter for screening for malignant neoplasm of colon  -     Colonoscopy Screening; Average Risk Patient; Future

## 2025-03-13 ENCOUNTER — APPOINTMENT (OUTPATIENT)
Dept: SURGERY | Facility: CLINIC | Age: 70
End: 2025-03-13
Payer: MEDICARE

## 2025-03-13 VITALS
HEART RATE: 2 BPM | WEIGHT: 124.7 LBS | TEMPERATURE: 98 F | DIASTOLIC BLOOD PRESSURE: 91 MMHG | SYSTOLIC BLOOD PRESSURE: 175 MMHG | BODY MASS INDEX: 19.83 KG/M2

## 2025-03-13 DIAGNOSIS — R19.09 GROIN MASS: Primary | ICD-10-CM

## 2025-03-13 PROCEDURE — 1123F ACP DISCUSS/DSCN MKR DOCD: CPT | Performed by: SURGERY

## 2025-03-13 PROCEDURE — 1036F TOBACCO NON-USER: CPT | Performed by: SURGERY

## 2025-03-13 PROCEDURE — 99213 OFFICE O/P EST LOW 20 MIN: CPT | Performed by: SURGERY

## 2025-03-13 PROCEDURE — 1160F RVW MEDS BY RX/DR IN RCRD: CPT | Performed by: SURGERY

## 2025-03-13 PROCEDURE — 3080F DIAST BP >= 90 MM HG: CPT | Performed by: SURGERY

## 2025-03-13 PROCEDURE — 1159F MED LIST DOCD IN RCRD: CPT | Performed by: SURGERY

## 2025-03-13 PROCEDURE — 1125F AMNT PAIN NOTED PAIN PRSNT: CPT | Performed by: SURGERY

## 2025-03-13 PROCEDURE — 3077F SYST BP >= 140 MM HG: CPT | Performed by: SURGERY

## 2025-03-13 ASSESSMENT — PAIN SCALES - GENERAL: PAINLEVEL_OUTOF10: 2

## 2025-03-13 NOTE — H&P (VIEW-ONLY)
History Of Present Illness  Sariah Ramos is a 69 y.o. female presenting in follow-up for recurrent right groin cyst.  I got an ultrasound that showed it was a simple cyst in the past.  I had aspirated it twice and is recurred.  She has seen her gynecologist with no other GYN abnormalities.  She says its become larger now.  She is in otherwise stable good health.        Last Recorded Vitals  Blood pressure (!) 175/91, pulse (!) 2, temperature 36.7 °C (98 °F), weight 56.6 kg (124 lb 11.2 oz).  Physical Examination  Abdomen is benign no scars she has a right inguinal cyst.  It is larger than last time.      Relevant Results  Reviewed previous ultrasound of this.    Assessment/Plan recurrent cyst in the right groin.  This could be like a communicating hydrocele.  I discussed with her doing an open resection of this and most likely repair of an indirect inguinal hernia.  I also just ordered an abdominal ultrasound to make sure there is no evidence of any ascites or other abnormalities in her abdomen.  She agrees with this plan.  Will schedule this at her convenience.    Nils Gonzáles MD FACS  Professor of Surgery  [employer] [facility] in Surgical [place]  [facility] [employer]  [id number] [place] [address], [id number]  Phone [phone]  email: [email or web address]

## 2025-03-13 NOTE — PROGRESS NOTES
History Of Present Illness  Sariah Ramos is a 69 y.o. female presenting in follow-up for recurrent right groin cyst.  I got an ultrasound that showed it was a simple cyst in the past.  I had aspirated it twice and is recurred.  She has seen her gynecologist with no other GYN abnormalities.  She says its become larger now.  She is in otherwise stable good health.        Last Recorded Vitals  Blood pressure (!) 175/91, pulse (!) 2, temperature 36.7 °C (98 °F), weight 56.6 kg (124 lb 11.2 oz).  Physical Examination  Abdomen is benign no scars she has a right inguinal cyst.  It is larger than last time.      Relevant Results  Reviewed previous ultrasound of this.    Assessment/Plan recurrent cyst in the right groin.  This could be like a communicating hydrocele.  I discussed with her doing an open resection of this and most likely repair of an indirect inguinal hernia.  I also just ordered an abdominal ultrasound to make sure there is no evidence of any ascites or other abnormalities in her abdomen.  She agrees with this plan.  Will schedule this at her convenience.    Nils Gonzáles MD FACS  Professor of Surgery  Tere Alfred[employer] Chair in Surgical [place]  [facility] [employer]  [id number] [place] [address], [id number]  Phone [phone]  email: [email or web address]

## 2025-03-18 ENCOUNTER — HOSPITAL ENCOUNTER (OUTPATIENT)
Dept: RADIOLOGY | Facility: CLINIC | Age: 70
Discharge: HOME | End: 2025-03-18
Payer: MEDICARE

## 2025-03-18 DIAGNOSIS — R19.09 GROIN MASS: ICD-10-CM

## 2025-03-18 PROCEDURE — 76700 US EXAM ABDOM COMPLETE: CPT | Performed by: STUDENT IN AN ORGANIZED HEALTH CARE EDUCATION/TRAINING PROGRAM

## 2025-03-18 PROCEDURE — 76700 US EXAM ABDOM COMPLETE: CPT

## 2025-03-19 ENCOUNTER — TELEPHONE (OUTPATIENT)
Dept: SURGERY | Facility: HOSPITAL | Age: 70
End: 2025-03-19
Payer: MEDICARE

## 2025-03-19 DIAGNOSIS — K76.9 LIVER LESION, LEFT LOBE: Primary | ICD-10-CM

## 2025-03-19 NOTE — TELEPHONE ENCOUNTER
Discussed the results of ultrasound.  The saw a lesion in her liver.  I have ordered a MRI of this

## 2025-03-28 ENCOUNTER — HOSPITAL ENCOUNTER (OUTPATIENT)
Dept: RADIOLOGY | Facility: CLINIC | Age: 70
Discharge: HOME | End: 2025-03-28
Payer: MEDICARE

## 2025-03-28 DIAGNOSIS — K76.9 LIVER LESION, LEFT LOBE: ICD-10-CM

## 2025-03-28 PROCEDURE — 74181 MRI ABDOMEN W/O CONTRAST: CPT

## 2025-03-29 ENCOUNTER — TELEPHONE (OUTPATIENT)
Dept: SURGERY | Facility: HOSPITAL | Age: 70
End: 2025-03-29
Payer: MEDICARE

## 2025-03-29 NOTE — TELEPHONE ENCOUNTER
I discussed with her that the MRI confirmed the stability of the lesion. Nothing else needs to be done. She has her surgery scheduled for April 11

## 2025-04-11 ENCOUNTER — ANESTHESIA EVENT (OUTPATIENT)
Dept: OPERATING ROOM | Facility: HOSPITAL | Age: 70
End: 2025-04-11
Payer: MEDICARE

## 2025-04-11 ENCOUNTER — ANESTHESIA (OUTPATIENT)
Dept: OPERATING ROOM | Facility: HOSPITAL | Age: 70
End: 2025-04-11
Payer: MEDICARE

## 2025-04-11 ENCOUNTER — HOSPITAL ENCOUNTER (OUTPATIENT)
Facility: HOSPITAL | Age: 70
Setting detail: OUTPATIENT SURGERY
Discharge: HOME | End: 2025-04-11
Attending: SURGERY | Admitting: SURGERY
Payer: MEDICARE

## 2025-04-11 VITALS
DIASTOLIC BLOOD PRESSURE: 81 MMHG | HEIGHT: 66 IN | BODY MASS INDEX: 19.49 KG/M2 | RESPIRATION RATE: 16 BRPM | HEART RATE: 70 BPM | TEMPERATURE: 98.2 F | WEIGHT: 121.25 LBS | OXYGEN SATURATION: 98 % | SYSTOLIC BLOOD PRESSURE: 140 MMHG

## 2025-04-11 DIAGNOSIS — R19.09 GROIN MASS: Primary | ICD-10-CM

## 2025-04-11 DIAGNOSIS — Z98.890 POST-OPERATIVE STATE: ICD-10-CM

## 2025-04-11 PROCEDURE — 7100000009 HC PHASE TWO TIME - INITIAL BASE CHARGE: Performed by: SURGERY

## 2025-04-11 PROCEDURE — 3700000002 HC GENERAL ANESTHESIA TIME - EACH INCREMENTAL 1 MINUTE: Performed by: SURGERY

## 2025-04-11 PROCEDURE — 88302 TISSUE EXAM BY PATHOLOGIST: CPT | Performed by: PATHOLOGY

## 2025-04-11 PROCEDURE — 2500000005 HC RX 250 GENERAL PHARMACY W/O HCPCS: Performed by: SURGERY

## 2025-04-11 PROCEDURE — 2720000007 HC OR 272 NO HCPCS: Performed by: SURGERY

## 2025-04-11 PROCEDURE — 7100000010 HC PHASE TWO TIME - EACH INCREMENTAL 1 MINUTE: Performed by: SURGERY

## 2025-04-11 PROCEDURE — 3600000008 HC OR TIME - EACH INCREMENTAL 1 MINUTE - PROCEDURE LEVEL THREE: Performed by: SURGERY

## 2025-04-11 PROCEDURE — 2500000001 HC RX 250 WO HCPCS SELF ADMINISTERED DRUGS (ALT 637 FOR MEDICARE OP): Performed by: ANESTHESIOLOGY

## 2025-04-11 PROCEDURE — 2500000004 HC RX 250 GENERAL PHARMACY W/ HCPCS (ALT 636 FOR OP/ED): Performed by: ANESTHESIOLOGY

## 2025-04-11 PROCEDURE — 7100000002 HC RECOVERY ROOM TIME - EACH INCREMENTAL 1 MINUTE: Performed by: SURGERY

## 2025-04-11 PROCEDURE — 2500000004 HC RX 250 GENERAL PHARMACY W/ HCPCS (ALT 636 FOR OP/ED): Performed by: NURSE ANESTHETIST, CERTIFIED REGISTERED

## 2025-04-11 PROCEDURE — 49505 PRP I/HERN INIT REDUC >5 YR: CPT | Performed by: SURGERY

## 2025-04-11 PROCEDURE — 2500000004 HC RX 250 GENERAL PHARMACY W/ HCPCS (ALT 636 FOR OP/ED): Performed by: SURGERY

## 2025-04-11 PROCEDURE — 0751T DGTZ GLS MCRSCP SLD LEVEL II: CPT | Mod: TC,AHULAB,WESLAB | Performed by: SURGERY

## 2025-04-11 PROCEDURE — 3700000001 HC GENERAL ANESTHESIA TIME - INITIAL BASE CHARGE: Performed by: SURGERY

## 2025-04-11 PROCEDURE — 7100000001 HC RECOVERY ROOM TIME - INITIAL BASE CHARGE: Performed by: SURGERY

## 2025-04-11 PROCEDURE — 3600000003 HC OR TIME - INITIAL BASE CHARGE - PROCEDURE LEVEL THREE: Performed by: SURGERY

## 2025-04-11 RX ORDER — FENTANYL CITRATE 50 UG/ML
INJECTION, SOLUTION INTRAMUSCULAR; INTRAVENOUS AS NEEDED
Status: DISCONTINUED | OUTPATIENT
Start: 2025-04-11 | End: 2025-04-11

## 2025-04-11 RX ORDER — ONDANSETRON HYDROCHLORIDE 2 MG/ML
INJECTION, SOLUTION INTRAVENOUS AS NEEDED
Status: DISCONTINUED | OUTPATIENT
Start: 2025-04-11 | End: 2025-04-11

## 2025-04-11 RX ORDER — SODIUM CHLORIDE, SODIUM LACTATE, POTASSIUM CHLORIDE, CALCIUM CHLORIDE 600; 310; 30; 20 MG/100ML; MG/100ML; MG/100ML; MG/100ML
INJECTION, SOLUTION INTRAVENOUS CONTINUOUS PRN
Status: DISCONTINUED | OUTPATIENT
Start: 2025-04-11 | End: 2025-04-11

## 2025-04-11 RX ORDER — OXYCODONE HYDROCHLORIDE 5 MG/1
5 TABLET ORAL EVERY 4 HOURS PRN
Status: DISCONTINUED | OUTPATIENT
Start: 2025-04-11 | End: 2025-04-11 | Stop reason: HOSPADM

## 2025-04-11 RX ORDER — MIDAZOLAM HYDROCHLORIDE 1 MG/ML
INJECTION INTRAMUSCULAR; INTRAVENOUS AS NEEDED
Status: DISCONTINUED | OUTPATIENT
Start: 2025-04-11 | End: 2025-04-11

## 2025-04-11 RX ORDER — CEFAZOLIN 1 G/1
INJECTION, POWDER, FOR SOLUTION INTRAVENOUS AS NEEDED
Status: DISCONTINUED | OUTPATIENT
Start: 2025-04-11 | End: 2025-04-11

## 2025-04-11 RX ORDER — OXYCODONE HYDROCHLORIDE 5 MG/1
5 TABLET ORAL EVERY 6 HOURS PRN
Qty: 5 TABLET | Refills: 0 | Status: SHIPPED | OUTPATIENT
Start: 2025-04-11

## 2025-04-11 RX ORDER — PROPOFOL 10 MG/ML
INJECTION, EMULSION INTRAVENOUS AS NEEDED
Status: DISCONTINUED | OUTPATIENT
Start: 2025-04-11 | End: 2025-04-11

## 2025-04-11 RX ORDER — SODIUM CHLORIDE, SODIUM LACTATE, POTASSIUM CHLORIDE, CALCIUM CHLORIDE 600; 310; 30; 20 MG/100ML; MG/100ML; MG/100ML; MG/100ML
100 INJECTION, SOLUTION INTRAVENOUS CONTINUOUS
Status: DISCONTINUED | OUTPATIENT
Start: 2025-04-11 | End: 2025-04-11 | Stop reason: HOSPADM

## 2025-04-11 RX ORDER — ACETAMINOPHEN 325 MG/1
650 TABLET ORAL EVERY 4 HOURS PRN
Status: DISCONTINUED | OUTPATIENT
Start: 2025-04-11 | End: 2025-04-11 | Stop reason: HOSPADM

## 2025-04-11 RX ORDER — SODIUM CHLORIDE 0.9 G/100ML
INJECTION, SOLUTION IRRIGATION AS NEEDED
Status: DISCONTINUED | OUTPATIENT
Start: 2025-04-11 | End: 2025-04-11 | Stop reason: HOSPADM

## 2025-04-11 RX ORDER — LIDOCAINE HYDROCHLORIDE 10 MG/ML
0.1 INJECTION, SOLUTION EPIDURAL; INFILTRATION; INTRACAUDAL; PERINEURAL ONCE
Status: DISCONTINUED | OUTPATIENT
Start: 2025-04-11 | End: 2025-04-11 | Stop reason: HOSPADM

## 2025-04-11 RX ORDER — LIDOCAINE HYDROCHLORIDE 20 MG/ML
INJECTION, SOLUTION EPIDURAL; INFILTRATION; INTRACAUDAL; PERINEURAL AS NEEDED
Status: DISCONTINUED | OUTPATIENT
Start: 2025-04-11 | End: 2025-04-11

## 2025-04-11 RX ADMIN — PROPOFOL 50 MG: 10 INJECTION, EMULSION INTRAVENOUS at 09:30

## 2025-04-11 RX ADMIN — PROPOFOL 50 MG: 10 INJECTION, EMULSION INTRAVENOUS at 09:40

## 2025-04-11 RX ADMIN — CEFAZOLIN 2 G: 330 INJECTION, POWDER, FOR SOLUTION INTRAMUSCULAR; INTRAVENOUS at 09:10

## 2025-04-11 RX ADMIN — FENTANYL CITRATE 25 MCG: 50 INJECTION, SOLUTION INTRAMUSCULAR; INTRAVENOUS at 09:31

## 2025-04-11 RX ADMIN — PROPOFOL 50 MG: 10 INJECTION, EMULSION INTRAVENOUS at 09:20

## 2025-04-11 RX ADMIN — FENTANYL CITRATE 25 MCG: 50 INJECTION, SOLUTION INTRAMUSCULAR; INTRAVENOUS at 09:25

## 2025-04-11 RX ADMIN — PROPOFOL 50 MG: 10 INJECTION, EMULSION INTRAVENOUS at 09:25

## 2025-04-11 RX ADMIN — PROPOFOL 50 MG: 10 INJECTION, EMULSION INTRAVENOUS at 09:45

## 2025-04-11 RX ADMIN — ONDANSETRON 4 MG: 2 INJECTION INTRAMUSCULAR; INTRAVENOUS at 09:20

## 2025-04-11 RX ADMIN — SODIUM CHLORIDE, POTASSIUM CHLORIDE, SODIUM LACTATE AND CALCIUM CHLORIDE 100 ML/HR: 600; 310; 30; 20 INJECTION, SOLUTION INTRAVENOUS at 10:18

## 2025-04-11 RX ADMIN — DEXAMETHASONE SODIUM PHOSPHATE 8 MG: 4 INJECTION, SOLUTION INTRA-ARTICULAR; INTRALESIONAL; INTRAMUSCULAR; INTRAVENOUS; SOFT TISSUE at 09:20

## 2025-04-11 RX ADMIN — FENTANYL CITRATE 50 MCG: 50 INJECTION, SOLUTION INTRAMUSCULAR; INTRAVENOUS at 10:12

## 2025-04-11 RX ADMIN — OXYCODONE HYDROCHLORIDE 5 MG: 5 TABLET ORAL at 11:02

## 2025-04-11 RX ADMIN — PROPOFOL 50 MG: 10 INJECTION, EMULSION INTRAVENOUS at 09:35

## 2025-04-11 RX ADMIN — MIDAZOLAM HYDROCHLORIDE 2 MG: 1 INJECTION, SOLUTION INTRAMUSCULAR; INTRAVENOUS at 09:10

## 2025-04-11 RX ADMIN — PROPOFOL 50 MG: 10 INJECTION, EMULSION INTRAVENOUS at 09:50

## 2025-04-11 RX ADMIN — LIDOCAINE HYDROCHLORIDE 50 MG: 20 INJECTION, SOLUTION EPIDURAL; INFILTRATION; INTRACAUDAL; PERINEURAL at 09:20

## 2025-04-11 RX ADMIN — SODIUM CHLORIDE, POTASSIUM CHLORIDE, SODIUM LACTATE AND CALCIUM CHLORIDE: 600; 310; 30; 20 INJECTION, SOLUTION INTRAVENOUS at 09:10

## 2025-04-11 SDOH — HEALTH STABILITY: MENTAL HEALTH: CURRENT SMOKER: 0

## 2025-04-11 ASSESSMENT — PAIN SCALES - GENERAL
PAINLEVEL_OUTOF10: 0 - NO PAIN
PAINLEVEL_OUTOF10: 4
PAINLEVEL_OUTOF10: 0 - NO PAIN

## 2025-04-11 ASSESSMENT — PAIN - FUNCTIONAL ASSESSMENT
PAIN_FUNCTIONAL_ASSESSMENT: 0-10

## 2025-04-11 NOTE — OP NOTE
Right Inguinal Hernia Repair (R) Operative Note     Date: 2025  OR Location: [facility] OR    Name: Sariah Ramos, : 1955, Age: 69 y.o., MRN: [id number], Sex: female    Diagnosis  Pre-op Diagnosis      * Groin mass [R19.09] Post-op Diagnosis     * Groin mass [R19.09]     Procedures  Right Inguinal Hernia Repair  [address] - [place] RPR 1ST INGUN HRNA AGE 5 YRS/> REDUCIBLE      Surgeons      * Nils Gonzáles - Primary    Resident/Fellow/Other Assistant:  Surgeons and Role:  * No surgeons found with a matching role *    Staff:   Circulator: Suzie  Scrub Person: Aubrey  Scrub Person: Lucía Zarate Circulator: Nila    Anesthesia Staff: Anesthesiologist: Pedro Parkinson MD  CRNA: HILLARY Lemon, ANIYAH    Procedure Summary  Anesthesia: General  ASA: II  Estimated Blood Loss: 5mL  Intra-op Medications:   Administrations occurring from 0845 to 1000 on 25:   Medication Name Total Dose   sodium chloride 0.9 % irrigation solution 1,000 mL   BUPivacaine HCl (Marcaine) 0.5 % (5 mg/mL) 30 mL, lidocaine (Xylocaine) 20 mL syringe 17 mL   ceFAZolin (Ancef) vial 1 g 2 g   dexAMETHasone (Decadron) injection 4 mg/mL 8 mg   fentaNYL (Sublimaze) injection 50 mcg/mL 50 mcg   lactated Ringer's infusion Cannot be calculated   lidocaine PF (Xylocaine-MPF)  2 % 50 mg   midazolam PF (Versed) injection 1 mg/mL 2 mg   ondansetron (Zofran) 2 mg/mL injection 4 mg   propofol (Diprivan) injection 10 mg/mL 350 mg              Anesthesia Record               Intraprocedure I/O Totals          Intake    lactated Ringer's 300.00 mL    Total Intake 300 mL          Specimen:   ID Type Source Tests Collected by Time   1 : HERNIA SAC Tissue HERNIA SAC SURGICAL PATHOLOGY EXAM Suzie THAKKAR RN 2025 0956                 Drains and/or Catheters: * None in log *    Tourniquet Times:         Implants:     Findings: indirect inguinal hernia with communicating cyst    Indications: Sariah Ramos is an 69 y.o. female who is having  surgery for Groin mass [R19.09].     The patient was seen in the preoperative area. The risks, benefits, complications, treatment options, non-operative alternatives, expected recovery and outcomes were discussed with the patient. The possibilities of reaction to medication, pulmonary aspiration, injury to surrounding structures, bleeding, recurrent infection, the need for additional procedures, failure to diagnose a condition, and creating a complication requiring transfusion or operation were discussed with the patient. The patient concurred with the proposed plan, giving informed consent.  The site of surgery was properly noted/marked if necessary per policy. The patient has been actively warmed in preoperative area. Preoperative antibiotics have been ordered and given within 1 hours of incision. Venous thrombosis prophylaxis have been ordered including bilateral sequential compression devices    Procedure Details: Patient brought to the op room.  IV sedation was given.  The groin areas prepped and draped.  A skin crease incision was made.  Identified the external Bleich which we opened.  Identified the round ligament which was communicating with the cyst I divided this with suture ligature.  I then removed both this round ligament with a communication along with a cyst which was really below the inguinal ligament.  I then did a Bassini repair with interrupted 2-0 Prolene from the conjoined tendon to the shelving edge of the inguinal ligament.  Close external oblique with 2-0 Vicryl.  Closed Maria Dolores's with 3-0 Vicryl and the skin with 4-0 Vicryl  Complications:  None; patient tolerated the procedure well.    Disposition: PACU - hemodynamically stable.  Condition: stable                 Attending Attestation: I was present and scrubbed for the entire procedure.    Nils Gonzáles  Phone Number: [phone]

## 2025-04-11 NOTE — ANESTHESIA POSTPROCEDURE EVALUATION
Patient: Sariah Ramos    Procedure Summary       Date: 04/11/25 Room / Location: [facility] / [facility]    Anesthesia Start: 0907 Anesthesia Stop:     Procedure: Right Inguinal Hernia Repair (Right: Abdomen) Diagnosis:       Groin mass      (Groin mass [R19.09])    Surgeons: Nils Gonzáles MD Responsible Provider: Pedro Parkinson MD    Anesthesia Type: general ASA Status: 2            Anesthesia Type: general    Vitals Value Taken Time   BP 97/54 04/11/25 1019   Temp 36.3 04/11/25 1019   Pulse 64 04/11/25 1019   Resp 16 04/11/25 1019   SpO2 100 04/11/25 1019       Anesthesia Post Evaluation    Patient location during evaluation: PACU  Patient participation: complete - patient participated  Level of consciousness: awake  Pain management: adequate  Airway patency: patent  Cardiovascular status: acceptable  Respiratory status: acceptable  Hydration status: acceptable  Postoperative Nausea and Vomiting: none      There were no known notable events for this encounter.

## 2025-04-11 NOTE — ANESTHESIA PREPROCEDURE EVALUATION
Patient: Sariah Ramos    Procedure Information       Date/Time: 25 0845    Procedure: Right Inguinal Hernia Repair (Right: Abdomen)    Location: [facility] 04 /  [facility] OR    Surgeons: Nils Gonzáles MD            Relevant Problems   Cardiac   (+) Atrial fibrillation (Multi)   (+) Benign essential hypertension   (+) White coat syndrome with diagnosis of hypertension      GI   (+) GERD without esophagitis      Skin   (+) Eczema       Clinical information reviewed:   Tobacco  Allergies  Meds   Med Hx  Surg Hx  OB Status  Fam Hx  Soc   Hx         Past Medical History:   Diagnosis Date   • A-fib (Multi)    • GERD (gastroesophageal reflux disease)    • HTN (hypertension)    • Inguinal hernia       Past Surgical History:   Procedure Laterality Date   • BREAST BIOPSY  2014    Biopsy Breast Open at age 25   • CARDIOVERSION     •  SECTION, CLASSIC  2014     Section   • TUBAL LIGATION  2014    Tubal Ligation     Social History     Tobacco Use   • Smoking status: Never     Passive exposure: Never   • Smokeless tobacco: Never   Vaping Use   • Vaping status: Never Used   Substance Use Topics   • Alcohol use: Never   • Drug use: Never      Current Outpatient Medications   Medication Instructions   • b complex 0.4 mg tablet 1 tablet, Every other day   • cholecalciferol (VITAMIN D3) 1,000 Units, Every other day   • hydrALAZINE (Apresoline) 25 mg tablet Take 2 tablets (50 mg) by mouth once daily in the morning AND 1 tablet (25 mg) once daily at bedtime.      Allergies   Allergen Reactions   • Bisoprolol Fumarate Cough   • Iodinated Contrast Media Unknown   • Latex Itching   • Lisinopril Cough   • Meperidine Unknown   • Triamterene Insomnia        Chemistry    Lab Results   Component Value Date/Time     2024 1522    K 3.7 2024 1522     2024 1522    CO2 28 2024 1522    BUN 19 2024 1522    CREATININE 0.77 2024 1522    Lab  "Results   Component Value Date/Time    CALCIUM 10.1 08/01/2024 1522    ALKPHOS CANCELED 08/24/2023 0140    AST CANCELED 08/24/2023 0140    ALT CANCELED 08/24/2023 0140    BILITOT CANCELED 08/24/2023 0140          Lab Results   Component Value Date    HGBA1C 5.1 12/04/2024     Lab Results   Component Value Date/Time    WBC 4.2 (L) 08/01/2024 1522    HGB 15.5 08/01/2024 1522    HCT 48.3 (H) 08/01/2024 1522     08/01/2024 1522     No results found for: \"PROTIME\", \"PTT\", \"INR\"  No results found for: \"ABORH\"  Encounter Date: 12/16/24   ECG 12 lead (Clinic Performed)   Result Value    Ventricular Rate 96    Atrial Rate 96    DC Interval 132    QRS Duration 70    QT Interval 332    QTC Calculation(Bazett) 419    P Axis 80    R Axis 100    T Axis 61    QRS Count 16    Q Onset 223    P Onset 157    P Offset 204    T Offset 389    QTC Fredericia 388    Narrative    Normal sinus rhythm  Biatrial enlargement  Rightward axis  Nonspecific ST changes  Abnormal ECG  When compared with ECG of 01-AUG-2024 15:18,  Rate is now faster  Confirmed by Steve Price ([id number]) on 12/19/2024 1:43:17 PM     No results found for this or any previous visit from the past 1095 days.       Visit Vitals  /88   Pulse 85   Temp 36.7 °C (98.1 °F) (Temporal)   Resp 20   Ht 1.676 m (5' 6\")   Wt 55 kg (121 lb 4.1 oz)   SpO2 99%   BMI 19.57 kg/m²   OB Status Postmenopausal   Smoking Status Never   BSA 1.6 m²     NPO/Void Status  Carbohydrate Drink Given Prior to Surgery? : N  Date of Last Liquid: 04/10/25  Time of Last Liquid: 0700  Date of Last Solid: 04/10/25  Time of Last Solid: 2200  Last Intake Type: Clear fluids  Time of Last Void: 0740        Physical Exam    Airway  Mallampati: II  TM distance: >3 FB  Neck ROM: full     Cardiovascular - normal exam     Dental - normal exam     Pulmonary - normal exam     Abdominal - normal exam         Anesthesia Plan    History of general anesthesia?: no  History of complications of general anesthesia?: " no    ASA 2     general     The patient is not a current smoker.    intravenous induction   Postoperative administration of opioids is intended.  Anesthetic plan and risks discussed with patient.    Plan discussed with CRNA and attending.

## 2025-04-11 NOTE — DISCHARGE INSTRUCTIONS
You have surgical glue over your incision. You may shower and use soap and water over your incision. Pat dry. The surgical glue with slowly dissolve and fall away.    For pain, take Tylenol (Acetaminophen) 650mg once every 4-6 hours, even if you do not have pain, for at least the next 5 days. For further pain, take oxycodone as instructed. Do not take more than 4000mg of tylenol in a 24-hour period; be careful with other medicines that contain Tylenol such as Nyquil or Percocet.     No heavy lifting or straining for 6 weeks

## 2025-04-11 NOTE — BRIEF OP NOTE
Date: 2025  OR Location: [facility] OR    Name: Sariah Ramos, : 1955, Age: 69 y.o., MRN: [id number], Sex: female    Diagnosis  Pre-op Diagnosis      * Groin mass [R19.09] Post-op Diagnosis     * Groin mass [R19.09]     Procedures  Right Inguinal Hernia Repair  10062 - [place] RPR 1ST INGUN HRNA AGE 7 YRS/> REDUCIBLE      Surgeons      * Nils Gonzáles - Primary    Resident/Fellow/Other Assistant:  Surgeons and Role:  * No surgeons found with a matching role *    Staff:   Circulator: Suzie  Scrub Person: Aubrey  Scrub Person: Lucía Zarate Circulator: Nila    Anesthesia Staff: Anesthesiologist: Pedro Parkinson MD  CRNA: ANTHONY Lemon-AUDREY, ANIYAH    Procedure Summary  Anesthesia: General  ASA: II  Estimated Blood Loss: 2mL  Intra-op Medications:   Administrations occurring from 0845 to 1000 on 25:   Medication Name Total Dose   sodium chloride 0.9 % irrigation solution 1,000 mL   BUPivacaine HCl (Marcaine) 0.5 % (5 mg/mL) 30 mL, lidocaine (Xylocaine) 20 mL syringe 17 mL   ceFAZolin (Ancef) vial 1 g 2 g   dexAMETHasone (Decadron) injection 4 mg/mL 8 mg   fentaNYL (Sublimaze) injection 50 mcg/mL 50 mcg   lactated Ringer's infusion Cannot be calculated   lidocaine PF (Xylocaine-MPF)  2 % 50 mg   midazolam PF (Versed) injection 1 mg/mL 2 mg   ondansetron (Zofran) 2 mg/mL injection 4 mg   propofol (Diprivan) injection 10 mg/mL 350 mg              Anesthesia Record               Intraprocedure I/O Totals       None           Specimen:   ID Type Source Tests Collected by Time   1 : HERNIA SAC Tissue HERNIA SAC SURGICAL PATHOLOGY EXAM Suzie THAKKAR RN 2025 0941                  Findings: Communicating hydrocele  Complications:  None; patient tolerated the procedure well.     Disposition: PACU - hemodynamically stable.  Condition: stable  Specimens Collected:   ID Type Source Tests Collected by Time   1 : HERNIA SAC Tissue HERNIA SAC SURGICAL PATHOLOGY EXAM Suzie THAKKAR RN 2025 7157      Attending Attestation:     Nils Gonzáles  Phone Number: [phone]

## 2025-04-11 NOTE — INTERVAL H&P NOTE
H&P reviewed. The patient was examined and there are no changes to the H&P.    Marked the right groin cyst area    She agrees and consents

## 2025-04-14 ASSESSMENT — PAIN SCALES - GENERAL: PAINLEVEL_OUTOF10: 0 - NO PAIN

## 2025-05-01 ENCOUNTER — APPOINTMENT (OUTPATIENT)
Dept: SURGERY | Facility: CLINIC | Age: 70
End: 2025-05-01
Payer: MEDICARE

## 2025-05-01 VITALS
WEIGHT: 126 LBS | BODY MASS INDEX: 20.34 KG/M2 | HEART RATE: 87 BPM | DIASTOLIC BLOOD PRESSURE: 83 MMHG | SYSTOLIC BLOOD PRESSURE: 167 MMHG

## 2025-05-01 DIAGNOSIS — R19.09 GROIN MASS: Primary | ICD-10-CM

## 2025-05-01 PROCEDURE — 1159F MED LIST DOCD IN RCRD: CPT | Performed by: SURGERY

## 2025-05-01 PROCEDURE — 1036F TOBACCO NON-USER: CPT | Performed by: SURGERY

## 2025-05-01 PROCEDURE — 99024 POSTOP FOLLOW-UP VISIT: CPT | Performed by: SURGERY

## 2025-05-01 PROCEDURE — 3079F DIAST BP 80-89 MM HG: CPT | Performed by: SURGERY

## 2025-05-01 PROCEDURE — 1160F RVW MEDS BY RX/DR IN RCRD: CPT | Performed by: SURGERY

## 2025-05-01 PROCEDURE — 3077F SYST BP >= 140 MM HG: CPT | Performed by: SURGERY

## 2025-05-01 PROCEDURE — 1123F ACP DISCUSS/DSCN MKR DOCD: CPT | Performed by: SURGERY

## 2025-05-01 NOTE — PROGRESS NOTES
History Of Present Illness  Sariah Ramos is a 69 y.o. female presenting she is status post open right inguinal repair along with excision of the cystic mass.  The mass cyst is benign.      Last Recorded Vitals  Blood pressure 167/83, pulse 87, weight 57.2 kg (126 lb).  Physical Exam expected swelling in the right groin area after open inguinal hernia repair.      Assessment/Plan   She is doing well from her open inguinal hernia repair.  She has a typical swelling.  I discussed with her persistent fascial defect was small I did not use any mesh as a small indirect inguinal hernia.  She has no limitations.  Activities she will follow-up me as needed    Nils Gonzáles MD FACS  Professor of Surgery  Tere Alfredo Chair in Surgical [place]  [employer]  [address] [place] [address], [id number]  Phone [phone]  email: [email or web address]

## 2025-08-12 ENCOUNTER — OFFICE VISIT (OUTPATIENT)
Dept: CARDIOLOGY | Facility: CLINIC | Age: 70
End: 2025-08-12
Payer: MEDICARE

## 2025-08-12 VITALS
WEIGHT: 126.44 LBS | BODY MASS INDEX: 19.84 KG/M2 | HEART RATE: 75 BPM | SYSTOLIC BLOOD PRESSURE: 173 MMHG | DIASTOLIC BLOOD PRESSURE: 92 MMHG | HEIGHT: 67 IN | OXYGEN SATURATION: 97 %

## 2025-08-12 DIAGNOSIS — I48.91 ATRIAL FIBRILLATION, UNSPECIFIED TYPE (MULTI): ICD-10-CM

## 2025-08-12 DIAGNOSIS — I10 BENIGN ESSENTIAL HYPERTENSION: Primary | ICD-10-CM

## 2025-08-12 PROCEDURE — 1126F AMNT PAIN NOTED NONE PRSNT: CPT | Performed by: INTERNAL MEDICINE

## 2025-08-12 PROCEDURE — 1159F MED LIST DOCD IN RCRD: CPT | Performed by: INTERNAL MEDICINE

## 2025-08-12 PROCEDURE — 99212 OFFICE O/P EST SF 10 MIN: CPT

## 2025-08-12 PROCEDURE — 99214 OFFICE O/P EST MOD 30 MIN: CPT | Performed by: INTERNAL MEDICINE

## 2025-08-12 PROCEDURE — 3080F DIAST BP >= 90 MM HG: CPT | Performed by: INTERNAL MEDICINE

## 2025-08-12 PROCEDURE — 3077F SYST BP >= 140 MM HG: CPT | Performed by: INTERNAL MEDICINE

## 2025-08-12 PROCEDURE — 3008F BODY MASS INDEX DOCD: CPT | Performed by: INTERNAL MEDICINE

## 2025-08-12 PROCEDURE — 1036F TOBACCO NON-USER: CPT | Performed by: INTERNAL MEDICINE

## 2025-08-12 ASSESSMENT — ENCOUNTER SYMPTOMS
DYSURIA: 0
COUGH: 0
OCCASIONAL FEELINGS OF UNSTEADINESS: 1
FEVER: 0
HEMATURIA: 0
CHILLS: 0
VOMITING: 0
DIARRHEA: 0
HEADACHES: 0
NAUSEA: 0
BLOATING: 0
LOSS OF SENSATION IN FEET: 0
MYALGIAS: 0
ABDOMINAL PAIN: 0
WHEEZING: 0
CONSTIPATION: 0
MEMORY LOSS: 0
ALTERED MENTAL STATUS: 0
HEMOPTYSIS: 0
DEPRESSION: 0
FALLS: 0

## 2025-08-12 ASSESSMENT — PAIN SCALES - GENERAL: PAINLEVEL_OUTOF10: 0-NO PAIN

## 2025-09-04 ENCOUNTER — APPOINTMENT (OUTPATIENT)
Dept: PRIMARY CARE | Facility: CLINIC | Age: 70
End: 2025-09-04
Payer: MEDICARE

## 2025-09-09 ENCOUNTER — APPOINTMENT (OUTPATIENT)
Dept: PRIMARY CARE | Facility: CLINIC | Age: 70
End: 2025-09-09
Payer: MEDICARE

## (undated) DEVICE — HOLSTER, ELECTROSURGERY ACCESSORY, STERILE

## (undated) DEVICE — STRIP, SKIN CLOSURE, STERI STRIP, REINFORCED, 0.5 X 4 IN

## (undated) DEVICE — SUTURE, VICRYL, 2-0, 27 IN, SH, UNDYED

## (undated) DEVICE — PAD, GROUNDING, ELECTROSURGICAL, W/9 FT CABLE, POLYHESIVE II, ADULT, LF

## (undated) DEVICE — SUTURE, VICRYL PLUS 3-0, SH, 27IN

## (undated) DEVICE — ADHESIVE, SKIN, DERMABOND ADVANCED, 15CM, PEN-STYLE

## (undated) DEVICE — DRAPE, SHEET, ENDOSCOPY, GENERAL, FENESTRATED, ARMBOARD COVER, 98 X 123.5 IN, DISPOSABLE, LF, STERILE

## (undated) DEVICE — SPONGE, LAP, XRAY DECT, SC+RFID, 18X18, NO LOOP, STERILE

## (undated) DEVICE — Device

## (undated) DEVICE — SUTURE, VICRYL PLUS, 4-0, 27 IN, PS-2

## (undated) DEVICE — SUTURE, PROLENE, 3-0, 30 IN, SH

## (undated) DEVICE — SYRINGE, 50 CC, LUER LOCK

## (undated) DEVICE — DRAIN, PENROSE, 0.25 X 12 IN, LF

## (undated) DEVICE — APPLICATOR, CHLORAPREP, W/ORANGE TINT, 26ML

## (undated) DEVICE — TOWEL, SURGICAL, NEURO, O/R, 16 X 26, BLUE, STERILE

## (undated) DEVICE — GLOVE, SURGICAL, PROTEXIS PI W/NEU-THERA, 7.5, PF, LF

## (undated) DEVICE — SUTURE, PROLENE, 2-0, 30 IN, SH, BLUE